# Patient Record
Sex: FEMALE | Race: WHITE | NOT HISPANIC OR LATINO | Employment: FULL TIME | URBAN - METROPOLITAN AREA
[De-identification: names, ages, dates, MRNs, and addresses within clinical notes are randomized per-mention and may not be internally consistent; named-entity substitution may affect disease eponyms.]

---

## 2021-04-08 DIAGNOSIS — Z23 ENCOUNTER FOR IMMUNIZATION: ICD-10-CM

## 2021-05-25 ENCOUNTER — OFFICE VISIT (OUTPATIENT)
Dept: FAMILY MEDICINE CLINIC | Facility: CLINIC | Age: 41
End: 2021-05-25
Payer: COMMERCIAL

## 2021-05-25 VITALS
RESPIRATION RATE: 19 BRPM | DIASTOLIC BLOOD PRESSURE: 88 MMHG | BODY MASS INDEX: 38.57 KG/M2 | SYSTOLIC BLOOD PRESSURE: 126 MMHG | HEART RATE: 91 BPM | HEIGHT: 66 IN | OXYGEN SATURATION: 98 % | TEMPERATURE: 97.6 F | WEIGHT: 240 LBS

## 2021-05-25 DIAGNOSIS — R53.83 TIREDNESS: ICD-10-CM

## 2021-05-25 DIAGNOSIS — E55.9 VITAMIN D DEFICIENCY: ICD-10-CM

## 2021-05-25 DIAGNOSIS — F41.9 ANXIETY: Primary | ICD-10-CM

## 2021-05-25 PROCEDURE — 3008F BODY MASS INDEX DOCD: CPT | Performed by: FAMILY MEDICINE

## 2021-05-25 PROCEDURE — 1036F TOBACCO NON-USER: CPT | Performed by: FAMILY MEDICINE

## 2021-05-25 PROCEDURE — 99203 OFFICE O/P NEW LOW 30 MIN: CPT | Performed by: FAMILY MEDICINE

## 2021-05-25 PROCEDURE — 3725F SCREEN DEPRESSION PERFORMED: CPT | Performed by: FAMILY MEDICINE

## 2021-05-25 RX ORDER — IBUPROFEN 200 MG
TABLET ORAL EVERY 6 HOURS PRN
COMMUNITY

## 2021-05-25 RX ORDER — LEVOCETIRIZINE DIHYDROCHLORIDE 5 MG/1
5 TABLET, FILM COATED ORAL DAILY
COMMUNITY

## 2021-05-25 NOTE — PROGRESS NOTES
Chief Complaint   Patient presents with   [address]     wants to talk about ADHD- unorganized, procrastination, easily distracted, trouble concentrating    Anxiety       Assessment/Plan:  Non fasting labs and Psychiatry evaluation  PHQ-9 Depression Screening    PHQ-9:   Frequency of the following problems over the past two weeks:      Little interest or pleasure in doing things: 0 - not at all  Feeling down, depressed, or hopeless: 1 - several days  PHQ-2 Score: 1       BMI Counseling: Body mass index is 38 74 kg/m²  The BMI is above normal  Nutrition recommendations include moderation in carbohydrate intake and increasing intake of lean protein  Diagnoses and all orders for this visit:    Anxiety  -     Ambulatory referral to Psychiatry; Future    Tiredness  -     CBC and differential; Future  -     Basic metabolic panel; Future  -     Hepatic function panel; Future  -     TSH, 3rd generation; Future  -     T4, free; Future  -     T3; Future  -     Ambulatory referral to Psychiatry; Future    Vitamin D deficiency  -     Vitamin D 25 hydroxy; Future    Other orders  -     levocetirizine (XYZAL) 5 MG tablet; Take 5 mg by mouth daily  -     ibuprofen (MOTRIN) 200 mg tablet; Take by mouth every 6 (six) hours as needed for mild pain          Subjective:      Patient ID: Pooja Salazar is a 38 y o  female  Re establish  Here for several issues, putting things off, easily distracted, anxiety  Thinks may have ADHD that may of been going on since a child  Got info off of [facility]  Classmate with similar symptoms  Just finished school, 2 years, para legal   Tired a lot or most of the time  Gets emotional when asking too many questions about her chief complaints  SH: neg tob or [place]  Cycles regular, has a GYN - not seen lately  Sleeps well  Single, no children  Worked while going to school  Presently applying for a job        The following portions of the patient's history were reviewed and updated as appropriate: allergies, current medications, past family history, past medical history, past social history, past surgical history and problem list     Review of Systems   Constitutional: Negative  HENT: Negative  Respiratory: Negative  Cardiovascular: Negative  Musculoskeletal: Negative  Skin: Negative  Psychiatric/Behavioral: Negative  Objective:      /88 (BP Location: Left arm)   Pulse 91   Temp 97 6 °F (36 4 °C) (Temporal)   Resp 19   Ht 5' 6" (1 676 m)   Wt 109 kg (240 lb)   SpO2 98%   BMI 38 74 kg/m²       Current Outpatient Medications:     ibuprofen (MOTRIN) 200 mg tablet, Take by mouth every 6 (six) hours as needed for mild pain, Disp: , Rfl:     levocetirizine (XYZAL) 5 MG tablet, Take 5 mg by mouth daily, Disp: , Rfl:      No Known Allergies     Physical Exam  Constitutional:       Appearance: She is well-developed  HENT:      Head: Normocephalic and atraumatic  Right Ear: External ear normal       Left Ear: External ear normal       Nose: Nose normal    Eyes:      Conjunctiva/sclera: Conjunctivae normal       Pupils: Pupils are equal, round, and reactive to light  Neck:      Musculoskeletal: Normal range of motion and neck supple  Cardiovascular:      Rate and Rhythm: Normal rate and regular rhythm  Heart sounds: Normal heart sounds  Pulmonary:      Effort: Pulmonary effort is normal       Breath sounds: Normal breath sounds  Abdominal:      General: Bowel sounds are normal       Palpations: Abdomen is soft  Skin:     General: Skin is warm and dry  Neurological:      Mental Status: She is alert and oriented to person, place, and time  Deep Tendon Reflexes: Reflexes are normal and symmetric  Psychiatric:         Behavior: Behavior normal          Thought Content:  Thought content normal          Judgment: Judgment normal

## 2021-05-26 ENCOUNTER — TELEPHONE (OUTPATIENT)
Dept: PSYCHIATRY | Facility: CLINIC | Age: 41
End: 2021-05-26

## 2021-05-27 ENCOUNTER — TELEPHONE (OUTPATIENT)
Dept: PSYCHIATRY | Facility: CLINIC | Age: 41
End: 2021-05-27

## 2021-05-27 NOTE — TELEPHONE ENCOUNTER
Behavorial Health Outpatient Intake Questions    Referred by: PCP    Please advised interviewee that they need to answer all questions truthfully to allow for best care and any misrepresentations of information may affect their ability to be seen at this clinic   => Was this discussed? Yes     [facility]     Presenting Problem (in patient's words): Possible ADHD and would like to be evaluated  Are there any developmental disabilities? ? If yes, can they speak to you on the phone? If they are too limited to speak to you on phone, refer out No    Are you taking any psychiatric medications? No    => If yes, who prescribes? If yes, are they injectable medications? Does the patient have a language barrier or hearing impairment? No    Have you been treated at [facility] by a therapist or a doctor in the past? If yes, who? No    Has the patient been hospitalized for mental health? No   If yes, how long ago was last hospitalization and where was it? Do you actively use alcohol or marijuana or illegal substances? If yes, what and how much - refer out to Drug and alcohol treatment if use is excessive or daily use of illegal substances No concerns of substance abuse are reported  Do you have a community treatment team or ? No    Legal History-     Does the patient have any history of arrests, prison/correction time, or DUIs? No  If Yes-  1) What types of charges? 2) When were they last incarcerated? 3) Are they currently on parole or probation? Minor Child-    Who has custody of the child? Is there a custody agreement? If there is a custody agreement remind parent that they must bring a copy to the first appt or they will not be seen       Intake Team, please check with provider before scheduling if flags come up such as:  - complex case  - legal history (other than DUI)  - communication barrier concerns are present  - if, in your judgment, this needs further review    ACCEPTED as a patient Yes  => Appointment Date: 09/10/2021 w/ Suzen Layer    Referred Elsewhere? No    Name of Insurance Co: [address] ID# [id number]  [id number] Phone #  If ins is primary or secondary  If patient is a minor, parents information such as Name, D  O B of guarantor

## 2021-09-10 ENCOUNTER — OFFICE VISIT (OUTPATIENT)
Dept: PSYCHIATRY | Facility: CLINIC | Age: 41
End: 2021-09-10
Payer: COMMERCIAL

## 2021-09-10 DIAGNOSIS — F41.9 ANXIETY: ICD-10-CM

## 2021-09-10 DIAGNOSIS — F43.21 ADJUSTMENT DISORDER WITH DEPRESSED MOOD: Primary | ICD-10-CM

## 2021-09-10 DIAGNOSIS — R53.83 TIREDNESS: ICD-10-CM

## 2021-09-10 DIAGNOSIS — R41.840 CONCENTRATION DEFICIT: ICD-10-CM

## 2021-09-10 PROCEDURE — 90792 PSYCH DIAG EVAL W/MED SRVCS: CPT | Performed by: NURSE PRACTITIONER

## 2021-09-10 RX ORDER — BUPROPION HYDROCHLORIDE 150 MG/1
150 TABLET ORAL DAILY
Qty: 30 TABLET | Refills: 2 | Status: SHIPPED | OUTPATIENT
Start: 2021-09-10 | End: 2021-10-08

## 2021-09-10 NOTE — BH TREATMENT PLAN
TREATMENT PLAN (Medication Management Only)        [facility]    Name and Date of Birth:  Talmage Castleman 42 y o  1980  Date of Treatment Plan: September 10, 2021  Diagnosis/Diagnoses:    1  Adjustment disorder with depressed mood    2  Anxiety    3  Tiredness    4  Concentration deficit      Strengths/Personal Resources for Self-Care: motivation for treatment, taking medications as prescribed, ability to communicate well, ability to listen, ability to reason  Area/Areas of need (in own words): depressive symptoms, attention and concentration problems  1  Long Term Goal: improve depression and improve attention and concentration  Target Date: 6 months - 3/10/2022  Person/Persons responsible for completion of goal: Talmage Castleman  2  Short Term Objective (s) - How will we reach this goal?:   A  Provider new recommended medication/dosage changes and/or continue medication(s): continue current medications as prescribed  B   Attend medication management appointments regularly  C   Attend psychotherapy regularly  Target Date: 6 months - 3/10/2022  Person/Persons Responsible for Completion of Goal: Talmage Castleman  Progress Towards Goals: starting treatment  Treatment Modality: medication management with psychotherapy every 4 weeks  Review due 90 to 120 days from date of this plan: 6 months - 3/10/2022  Expected length of service: maintenance unless revised  My Physician/PA/NP and I have developed this plan together and I agree to work on the goals and objectives  I understand the treatment goals that were developed for my treatment    Treatment Plan Verbal Consent - Due to COVID
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
## 2021-10-08 ENCOUNTER — OFFICE VISIT (OUTPATIENT)
Dept: PSYCHIATRY | Facility: CLINIC | Age: 41
End: 2021-10-08
Payer: COMMERCIAL

## 2021-10-08 DIAGNOSIS — R41.840 CONCENTRATION DEFICIT: ICD-10-CM

## 2021-10-08 DIAGNOSIS — F43.21 ADJUSTMENT DISORDER WITH DEPRESSED MOOD: ICD-10-CM

## 2021-10-08 PROCEDURE — 99214 OFFICE O/P EST MOD 30 MIN: CPT | Performed by: NURSE PRACTITIONER

## 2021-10-08 RX ORDER — BUPROPION HYDROCHLORIDE 300 MG/1
300 TABLET ORAL DAILY
Qty: 30 TABLET | Refills: 2 | Status: SHIPPED | OUTPATIENT
Start: 2021-10-08 | End: 2022-01-11 | Stop reason: SDUPTHER

## 2021-12-03 ENCOUNTER — OFFICE VISIT (OUTPATIENT)
Dept: PSYCHIATRY | Facility: CLINIC | Age: 41
End: 2021-12-03
Payer: COMMERCIAL

## 2021-12-03 DIAGNOSIS — F43.21 ADJUSTMENT DISORDER WITH DEPRESSED MOOD: Primary | ICD-10-CM

## 2021-12-03 PROCEDURE — 99213 OFFICE O/P EST LOW 20 MIN: CPT | Performed by: NURSE PRACTITIONER

## 2022-01-11 ENCOUNTER — TELEPHONE (OUTPATIENT)
Dept: PSYCHIATRY | Facility: CLINIC | Age: 42
End: 2022-01-11

## 2022-01-11 DIAGNOSIS — F43.21 ADJUSTMENT DISORDER WITH DEPRESSED MOOD: ICD-10-CM

## 2022-01-11 DIAGNOSIS — R41.840 CONCENTRATION DEFICIT: ICD-10-CM

## 2022-01-11 RX ORDER — BUPROPION HYDROCHLORIDE 300 MG/1
300 TABLET ORAL DAILY
Qty: 30 TABLET | Refills: 0 | Status: SHIPPED | OUTPATIENT
Start: 2022-01-11 | End: 2022-02-08 | Stop reason: SDUPTHER

## 2022-02-08 ENCOUNTER — OFFICE VISIT (OUTPATIENT)
Dept: PSYCHIATRY | Facility: CLINIC | Age: 42
End: 2022-02-08
Payer: COMMERCIAL

## 2022-02-08 DIAGNOSIS — F43.21 ADJUSTMENT DISORDER WITH DEPRESSED MOOD: ICD-10-CM

## 2022-02-08 DIAGNOSIS — F33.1 MODERATE EPISODE OF RECURRENT MAJOR DEPRESSIVE DISORDER (HCC): ICD-10-CM

## 2022-02-08 DIAGNOSIS — R41.840 CONCENTRATION DEFICIT: Primary | ICD-10-CM

## 2022-02-08 PROCEDURE — 99214 OFFICE O/P EST MOD 30 MIN: CPT | Performed by: NURSE PRACTITIONER

## 2022-02-08 RX ORDER — BUPROPION HYDROCHLORIDE 300 MG/1
300 TABLET ORAL DAILY
Qty: 90 TABLET | Refills: 1 | Status: SHIPPED | OUTPATIENT
Start: 2022-02-08 | End: 2022-05-10 | Stop reason: SDUPTHER

## 2022-02-08 NOTE — PSYCH
MEDICATION MANAGEMENT NOTE        [address]    Name and Date of Birth:  Robin Ferraro 42 y o  1980 MRN: [id number]    Date of Visit: February 8, 2022    No Known Allergies  SUBJECTIVE:    Constance is seen today for a follow up for Major Depressive Disorder, Generalized Anxiety Disorder and Concentration deficit  She reports that she has experienced on and off symptoms since the last visit  Patient reports that while anxiety has been relatively well controlled she continues to struggle with finding motivation for basic tasks such as laundry and struggles with concentration  Continues to report high level of stress  Recently had 3 relatives passed away including an uncle she had not seen since the pandemic  Uncle refused to be vaccinated and pt felt uncomfortable  Continues to care for father who is undergoing chemotherapy  Worries about political situation, covid and other world affairs  She denies any side effects from medications  PLAN:  Initiate Zoloft 25 mg p o  Daily for 3 days then increase to 50 mg p o  Daily thereafter  Continue Wellbutrin  mg p o   Daily  Patient had individual therapy wait list; recommend call insurance company for list of in network providers  Follow-up in 3 months  Aware of 24 hour and weekend coverage for urgent situations accessed by calling [facility] main practice number    Diagnoses and all orders for this visit:    Concentration deficit    Adjustment disorder with depressed mood        Current Outpatient Medications on File Prior to Visit   Medication Sig Dispense Refill    buPROPion (WELLBUTRIN XL) 300 mg 24 hr tablet Take 1 tablet (300 mg total) by mouth daily 30 tablet 0    ibuprofen (MOTRIN) 200 mg tablet Take by mouth every 6 (six) hours as needed for mild pain      levocetirizine (XYZAL) 5 MG tablet Take 5 mg by mouth daily       No current facility-administered medications on file prior to visit  Psychotherapy Provided:     Individual psychotherapy provided: Yes  Counseling was provided during the session today for 16 minutes  Supportive counseling provided  HPI ROS Appetite Changes and Sleep:     She reports normal sleep, normal appetite, normal energy level   Denies homicidal ideation, denies suicidal ideation    Review Of Systems:      General emotional problems and decreased functioning   Personality no change in personality   Constitutional negative   ENT negative   Cardiovascular negative   Respiratory negative   Gastrointestinal negative   Genitourinary negative   Musculoskeletal negative   Integumentary negative   Neurological negative   Endocrine negative   Other Symptoms none, all other systems are negative     Mental Status Evaluation:    Appearance Adequate hygiene and grooming   Behavior cooperative and guarded   Mood depressed and angry  Depression Scale -  of 10 (0 = No depression)  Anxiety Scale -  of 10 (0 = No anxiety)   Speech Normal rate and volume   Affect constricted and Tearful   Thought Processes Goal directed and coherent   Thought Content Does not verbalize delusional material   Associations Tightly connected   Perceptual Disturbances Denies hallucinations and does not appear to be responding to internal stimuli   Risk Potential Suicidal/Homicidal Ideation - No evidence of suicidal or homicidal ideation and patient does not verbalize suicidal or homicidal ideation  Risk of Violence - No evidence of risk for violence found on assessment  Risk of Self Mutilation - No evidence of risk for self mutilation found on assessment   Orientation oriented to person, place, time/date and situation   Memory recent and remote memory grossly intact   Consciousness alert and awake   Attention/Concentration attention span and concentration are age appropriate   Insight partial   Judgement Fair   Muscle Strength and Gait normal muscle strength and normal muscle tone, normal gait/station and normal balance   Motor Activity no abnormal movements   Language no difficulty naming common objects, no difficulty repeating a phrase, no difficulty writing a sentence   Fund of Knowledge adequate knowledge of current events  adequate fund of knowledge regarding past history  adequate fund of knowledge regarding vocabulary      Past Psychiatric History Update:     Inpatient Psychiatric Admission Since Last Encounter:   no  Changes to Outpatient Psychiatric Treatment Team:    no  Suicide Attempt Or Self Mutilation Since Last Encounter:   no  Incidence of Violent Behavior Since Last Encounter:   no    Traumatic History Update:     New Onset of Abuse Since Last Encounter:   no  Traumatic Events Since Last Encounter:   no    Past Medical History:    No past medical history on file  No past medical history pertinent negatives    Past Surgical History:   Procedure Laterality Date    WISDOM TOOTH EXTRACTION       No Known Allergies  Substance Abuse History:    Social History     Substance and Sexual Activity   Alcohol Use Not Currently     Social History     Substance and Sexual Activity   Drug Use Not on file     Social History:    Social History     Socioeconomic History    Marital status: Single     Spouse name: Not on file    Number of children: Not on file    Years of education: Not on file    Highest education level: Not on file   Occupational History    Not on file   Tobacco Use    Smoking status: Former Smoker     Packs/day: 1 00     Years: 20 00     Pack years: 20 00     Types: Cigarettes     Start date: 55     Quit date: 2016     Years since quittin 7    Smokeless tobacco: Never Used   Vaping Use    Vaping Use: Never used   Substance and Sexual Activity    Alcohol use: Not Currently    Drug use: Not on file    Sexual activity: Not on file   Other Topics Concern    Not on file   Social History Narrative    Not on file     Social Determinants of Health Financial Resource Strain: Not on file   Food Insecurity: Not on file   Transportation Needs: Not on file   Physical Activity: Not on file   Stress: Not on file   Social Connections: Not on file   Intimate Partner Violence: Not on file   Housing Stability: Not on file     Family Psychiatric History:     Family History   Problem Relation Age of Onset    Hypertension Mother     Heart disease Father     Diabetes Father     Kidney disease Paternal Grandmother     Heart attack Paternal Grandfather      History Review: The following portions of the patient's history were reviewed and updated as appropriate: allergies, current medications, past family history, past medical history, past social history, past surgical history and problem list     OBJECTIVE:     Vital signs in last 24 hours: There were no vitals filed for this visit  Laboratory Results: I have personally reviewed all pertinent laboratory/tests results  Suicide/Homicide Risk Assessment:    Risk of Harm to Self:   The following ratings are based on assessment at the time of the interview   Recent Specific Risk Factors include: current depressive symptoms, current anxiety symptoms    Risk of Harm to Others:   The following ratings are based on assessment at the time of the interview   Recent Specific Risk Factors include: none  The following interventions are recommended: no intervention changes needed    Medications Risks/Benefits:      Risks, Benefits And Possible Side Effects Of Medications:    Discussed risks and benefits of treatment with patient including risk of suicidality, serotonin syndrome, increased QTc interval and SIADH related to treatment with antidepressants;  Risk of induction of manic symptoms in certain patient populations and Reduction in seizure threshold related to treatment with bupropion      Controlled Medication Discussion:     Not applicable    Treatment Plan:    Due for update/Updated:   yes    Evelin Eldridge SIMRAN 02/08/22    This note was shared with patient

## 2022-02-08 NOTE — BH TREATMENT PLAN
TREATMENT PLAN (Medication Management Only)        [facility]    Name and Date of Birth:  Saleem Solares 42 y o  1980  Date of Treatment Plan: February 8, 2022  Diagnosis/Diagnoses:    1  Concentration deficit    2  Adjustment disorder with depressed mood    3  Moderate episode of recurrent major depressive disorder [facility])      Strengths/Personal Resources for Self-Care: motivation for treatment, taking medications as prescribed, ability to communicate well, ability to listen, ability to reason  Area/Areas of need (in own words): depressive symptoms, anxiety symptoms  1  Long Term Goal: improve anxiety and improve depression  Target Date: 6 months - 8/8/2022  Person/Persons responsible for completion of goal: Saleem Beck  2  Short Term Objective (s) - How will we reach this goal?:   A  Provider new recommended medication/dosage changes and/or continue medication(s): continue current medications as prescribed  B   Attend medication management appointments regularly  C   Attend psychotherapy regularly  Target Date: 6 months - 8/8/2022  Person/Persons Responsible for Completion of Goal: Saleem Solares  Progress Towards Goals: continuing treatment  Treatment Modality: medication management with psychotherapy every 3 months  Review due 90 to 120 days from date of this plan: 6 months - 8/8/2022  Expected length of service: maintenance unless revised     My Physician/PA/NP and I have developed this plan together and I agree to work on the goals and objectives  I understand the treatment goals that were developed for my treatment    Treatment Plan Verbal Consent - Due to COVID

## 2022-05-10 ENCOUNTER — OFFICE VISIT (OUTPATIENT)
Dept: PSYCHIATRY | Facility: CLINIC | Age: 42
End: 2022-05-10
Payer: COMMERCIAL

## 2022-05-10 DIAGNOSIS — F43.21 ADJUSTMENT DISORDER WITH DEPRESSED MOOD: ICD-10-CM

## 2022-05-10 DIAGNOSIS — R41.840 CONCENTRATION DEFICIT: ICD-10-CM

## 2022-05-10 DIAGNOSIS — F33.1 MODERATE EPISODE OF RECURRENT MAJOR DEPRESSIVE DISORDER (HCC): Primary | ICD-10-CM

## 2022-05-10 PROCEDURE — 99214 OFFICE O/P EST MOD 30 MIN: CPT | Performed by: NURSE PRACTITIONER

## 2022-05-10 RX ORDER — BUPROPION HYDROCHLORIDE 300 MG/1
300 TABLET ORAL DAILY
Qty: 90 TABLET | Refills: 1 | Status: SHIPPED | OUTPATIENT
Start: 2022-05-10

## 2022-05-10 NOTE — PSYCH
MEDICATION MANAGEMENT NOTE        [facility]    Name and Date of Birth:  Christal Landis 42 y o  1980 MRN: [id number]    Date of Visit: May 10, 2022    No Known Allergies  SUBJECTIVE:    Aidan Smith is seen today for a follow up for Major Depressive Disorder and Generalized Anxiety Disorder  She reports that she has done fairly well since the last visit  Reports that since starting sertraline feelings of depression anxiety have resolved  Feels more energetic and rested in the morning  Has noticed some more frequent awakenings at night  Recommend trial melatonin 10 mg at bedtime for 2 weeks  She denies any side effects from medications  PLAN:  Initiate melatonin 10 mg PO HS for 2 weeks  Continue sertraline 50 mg p o  Daily  Continue Wellbutrin  mg p o  Daily  Follow-up in 4 months  Aware of 24 hour and weekend coverage for urgent situations accessed by calling [facility] main practice number    Diagnoses and all orders for this visit:    Moderate episode of recurrent major depressive disorder (HCC)    Concentration deficit  -     buPROPion (WELLBUTRIN XL) 300 mg 24 hr tablet; Take 1 tablet (300 mg total) by mouth daily  -     sertraline (ZOLOFT) 50 mg tablet; Take 1 tablet (50 mg total) by mouth daily    Adjustment disorder with depressed mood  -     buPROPion (WELLBUTRIN XL) 300 mg 24 hr tablet; Take 1 tablet (300 mg total) by mouth daily  -     sertraline (ZOLOFT) 50 mg tablet;  Take 1 tablet (50 mg total) by mouth daily        Current Outpatient Medications on File Prior to Visit   Medication Sig Dispense Refill    ibuprofen (MOTRIN) 200 mg tablet Take by mouth every 6 (six) hours as needed for mild pain      levocetirizine (XYZAL) 5 MG tablet Take 5 mg by mouth daily      [DISCONTINUED] buPROPion (WELLBUTRIN XL) 300 mg 24 hr tablet Take 1 tablet (300 mg total) by mouth daily 90 tablet 1    [DISCONTINUED] sertraline (ZOLOFT) 50 mg tablet Take 1 tablet (50 mg total) by mouth daily 90 tablet 1     No current facility-administered medications on file prior to visit  Psychotherapy Provided:     Individual psychotherapy provided: Yes  Counseling was provided during the session today for 16 minutes  Supportive counseling provided  HPI ROS Appetite Changes and Sleep:     She reports normal sleep, normal appetite, normal energy level   Denies homicidal ideation, denies suicidal ideation    Review Of Systems:      General normal    Personality no change in personality   Constitutional negative   ENT negative   Cardiovascular negative   Respiratory negative   Gastrointestinal negative   Genitourinary negative   Musculoskeletal negative   Integumentary negative   Neurological negative   Endocrine negative   Other Symptoms none, all other systems are negative     Mental Status Evaluation:    Appearance Adequate hygiene and grooming   Behavior calm and cooperative   Mood euthymic  Depression Scale -  of 10 (0 = No depression)  Anxiety Scale -  of 10 (0 = No anxiety)   Speech Normal rate and volume   Affect appropriate and mood-congruent   Thought Processes Goal directed and coherent   Thought Content Does not verbalize delusional material   Associations Tightly connected   Perceptual Disturbances Denies hallucinations and does not appear to be responding to internal stimuli   Risk Potential Suicidal/Homicidal Ideation - No evidence of suicidal or homicidal ideation and patient does not verbalize suicidal or homicidal ideation  Risk of Violence - No evidence of risk for violence found on assessment  Risk of Self Mutilation - No evidence of risk for self mutilation found on assessment   Orientation oriented to person, place, time/date and situation   Memory recent and remote memory grossly intact   Consciousness alert and awake   Attention/Concentration attention span and concentration are age appropriate   Insight intact Judgement intact   Muscle Strength and Gait normal muscle strength and normal muscle tone, normal gait/station and normal balance   Motor Activity no abnormal movements   Language no difficulty naming common objects, no difficulty repeating a phrase, no difficulty writing a sentence   Fund of Knowledge adequate knowledge of current events  adequate fund of knowledge regarding past history  adequate fund of knowledge regarding vocabulary      Past Psychiatric History Update:     Inpatient Psychiatric Admission Since Last Encounter:   no  Changes to Outpatient Psychiatric Treatment Team:    no  Suicide Attempt Or Self Mutilation Since Last Encounter:   no  Incidence of Violent Behavior Since Last Encounter:   no    Traumatic History Update:     New Onset of Abuse Since Last Encounter:   no  Traumatic Events Since Last Encounter:   no    Past Medical History:    No past medical history on file  No past medical history pertinent negatives    Past Surgical History:   Procedure Laterality Date    WISDOM TOOTH EXTRACTION       No Known Allergies  Substance Abuse History:    Social History     Substance and Sexual Activity   Alcohol Use Not Currently     Social History     Substance and Sexual Activity   Drug Use Not on file     Social History:    Social History     Socioeconomic History    Marital status: Single     Spouse name: Not on file    Number of children: Not on file    Years of education: Not on file    Highest education level: Not on file   Occupational History    Not on file   Tobacco Use    Smoking status: Former Smoker     Packs/day: 1 00     Years: 20 00     Pack years: 20 00     Types: Cigarettes     Start date: 55     Quit date: 2016     Years since quittin 9    Smokeless tobacco: Never Used   Vaping Use    Vaping Use: Never used   Substance and Sexual Activity    Alcohol use: Not Currently    Drug use: Not on file    Sexual activity: Not on file   Other Topics Concern    Not on file   Social History Narrative    Not on file     Social Determinants of Health     Financial Resource Strain: Not on file   Food Insecurity: Not on file   Transportation Needs: Not on file   Physical Activity: Not on file   Stress: Not on file   Social Connections: Not on file   Intimate Partner Violence: Not on file   Housing Stability: Not on file     Family Psychiatric History:     Family History   Problem Relation Age of Onset    Hypertension Mother     Heart disease Father     Diabetes Father     Kidney disease Paternal Grandmother     Heart attack Paternal Grandfather      History Review: The following portions of the patient's history were reviewed and updated as appropriate: allergies, current medications, past family history, past medical history, past social history, past surgical history and problem list     OBJECTIVE:     Vital signs in last 24 hours: There were no vitals filed for this visit  Laboratory Results: I have personally reviewed all pertinent laboratory/tests results  Suicide/Homicide Risk Assessment:    Risk of Harm to Self:   The following ratings are based on assessment at the time of the interview   Recent Specific Risk Factors include: none    Risk of Harm to Others:   The following ratings are based on assessment at the time of the interview   Recent Specific Risk Factors include: none  The following interventions are recommended: no intervention changes needed    Medications Risks/Benefits:      Risks, Benefits And Possible Side Effects Of Medications:    Discussed risks and benefits of treatment with patient including risk of suicidality, serotonin syndrome, increased QTc interval and SIADH related to treatment with antidepressants;  Risk of induction of manic symptoms in certain patient populations and Reduction in seizure threshold related to treatment with bupropion      Controlled Medication Discussion:     Marisela Tristan has been filling controlled prescriptions on time as prescribed according to [facility]    Treatment Plan:    Due for update/Updated:   SIMRAN Mckeon 05/10/22    This note was shared with patient

## 2022-09-09 ENCOUNTER — OFFICE VISIT (OUTPATIENT)
Dept: PSYCHIATRY | Facility: CLINIC | Age: 42
End: 2022-09-09
Payer: COMMERCIAL

## 2022-09-09 DIAGNOSIS — R41.840 CONCENTRATION DEFICIT: Primary | ICD-10-CM

## 2022-09-09 PROCEDURE — 99214 OFFICE O/P EST MOD 30 MIN: CPT | Performed by: NURSE PRACTITIONER

## 2022-09-09 RX ORDER — BUPROPION HYDROCHLORIDE 150 MG/1
150 TABLET ORAL DAILY
Qty: 5 TABLET | Refills: 0 | Status: SHIPPED | OUTPATIENT
Start: 2022-09-09

## 2022-09-09 RX ORDER — ATOMOXETINE 40 MG/1
40 CAPSULE ORAL DAILY
Qty: 30 CAPSULE | Refills: 0 | Status: SHIPPED | OUTPATIENT
Start: 2022-09-14 | End: 2022-10-18 | Stop reason: SDUPTHER

## 2022-09-09 NOTE — PSYCH
This note was not shared with the patient due to reasonable likelihood of causing patient harm    MEDICATION MANAGEMENT NOTE        [address] [place] [address]    Name and Date of Birth:  Chace Corado 36 y o  1980 MRN: [id number]    Date of Visit: September 9, 2022    No Known Allergies  SUBJECTIVE:    Kailee Adair is seen today for a follow up for depression, anxiety, insomnia and Concentration deficit  She reports that she has done fairly well since the last visit  Patient reports she has remained relatively stable  Sleep has improved somewhat over the last few days after starting use of weighted blanket  Reports that external stressors have decreased, father-in-law's doing better  Feels depression is relatively well controlled  Remains anxious in some situations is working towards improving overall anxiety  Believes sertraline has been helpful for improving depression anxiety  Patient is still concerned about difficulty with motivation, concentration and work performance  Was never able to start work as a  due to difficulty with concentration and motivation  Patient is concerned she may have ADHD  Is open to trial of Strattera  Will discontinue Abilify bupropion and initiate Strattera  Patient educated on risk of return some depressive symptoms following discontinuation of Wellbutrin  Patient is aware of risks and agrees with plan of care  She denies any side effects from medications  PLAN:  Reduce Wellbutrin  mg p o  Daily for 5 days then discontinue; Then initiate Strattera 40 mg p o  Daily  Continue sertraline 50 mg p o   Daily; consider increase if needed  Recommend neuropsychological testing  Follow-up in 3 months; may schedule an earlier appointment to review neuropsychological testing  Aware of 24 hour and weekend coverage for urgent situations accessed by calling [facility] main practice number    Diagnoses and all orders for this visit:    Concentration deficit  -     atoMOXetine (STRATTERA) 40 mg capsule; Take 1 capsule (40 mg total) by mouth daily  -     buPROPion (Wellbutrin XL) 150 mg 24 hr tablet; Take 1 tablet (150 mg total) by mouth daily Then stop this medication        Current Outpatient Medications on File Prior to Visit   Medication Sig Dispense Refill    buPROPion (WELLBUTRIN XL) 300 mg 24 hr tablet Take 1 tablet (300 mg total) by mouth daily 90 tablet 1    ibuprofen (MOTRIN) 200 mg tablet Take by mouth every 6 (six) hours as needed for mild pain      levocetirizine (XYZAL) 5 MG tablet Take 5 mg by mouth daily      sertraline (ZOLOFT) 50 mg tablet Take 1 tablet (50 mg total) by mouth daily 90 tablet 1     No current facility-administered medications on file prior to visit  Psychotherapy Provided:     Individual psychotherapy provided: Yes  Counseling was provided during the session today for 16 minutes  Supportive counseling provided  HPI ROS Appetite Changes and Sleep:     She reports Improved sleep 6-7 hrs, adequate appetite, adequate energy level, low motivation   Denies homicidal ideation, denies suicidal ideation    Review Of Systems:      General emotional problems and decreased functioning   Personality no change in personality   Constitutional negative   ENT negative   Cardiovascular negative   Respiratory negative   Gastrointestinal negative   Genitourinary negative   Musculoskeletal negative   Integumentary negative   Neurological negative   Endocrine negative   Other Symptoms none, all other systems are negative     Mental Status Evaluation:    Appearance Adequate hygiene and grooming   Behavior calm and cooperative   Mood anxious  Depression Scale -  of 10 (0 = No depression)  Anxiety Scale -  of 10 (0 = No anxiety)   Speech Normal rate and volume   Affect mood-congruent   Thought Processes Goal directed and coherent   Thought Content Does not verbalize delusional material   Associations Tightly connected   Perceptual Disturbances Denies hallucinations and does not appear to be responding to internal stimuli   Risk Potential Suicidal/Homicidal Ideation - No evidence of suicidal or homicidal ideation and patient does not verbalize suicidal or homicidal ideation  Risk of Violence - No evidence of risk for violence found on assessment  Risk of Self Mutilation - No evidence of risk for self mutilation found on assessment   Orientation oriented to person, place, time/date and situation   Memory recent and remote memory grossly intact   Consciousness alert and awake   Attention/Concentration attention span and concentration are age appropriate   Insight fair   Judgement fair   Muscle Strength and Gait normal muscle strength and normal muscle tone, normal gait/station and normal balance   Motor Activity no abnormal movements   Language no difficulty naming common objects, no difficulty repeating a phrase, no difficulty writing a sentence   Fund of Knowledge adequate knowledge of current events  adequate fund of knowledge regarding past history  adequate fund of knowledge regarding vocabulary      Past Psychiatric History Update:     Inpatient Psychiatric Admission Since Last Encounter:   no  Changes to Outpatient Psychiatric Treatment Team:    no  Suicide Attempt Or Self Mutilation Since Last Encounter:   no  Incidence of Violent Behavior Since Last Encounter:   no    Traumatic History Update:     New Onset of Abuse Since Last Encounter:   no  Traumatic Events Since Last Encounter:   no    Past Medical History:    No past medical history on file       Past Surgical History:   Procedure Laterality Date    WISDOM TOOTH EXTRACTION  1996     No Known Allergies  Substance Abuse History:    Social History     Substance and Sexual Activity   Alcohol Use Not Currently     Social History     Substance and Sexual Activity   Drug Use Not on file     Social History:    Social History Socioeconomic History    Marital status: Single     Spouse name: Not on file    Number of children: Not on file    Years of education: Not on file    Highest education level: Not on file   Occupational History    Not on file   Tobacco Use    Smoking status: Former Smoker     Packs/day: 1 00     Years: 20 00     Pack years: 20 00     Types: Cigarettes     Start date: 55     Quit date: 2016     Years since quittin 2    Smokeless tobacco: Never Used   Vaping Use    Vaping Use: Never used   Substance and Sexual Activity    Alcohol use: Not Currently    Drug use: Not on file    Sexual activity: Not on file   Other Topics Concern    Not on file   Social History Narrative    Not on file     Social Determinants of Health     Financial Resource Strain: Not on file   Food Insecurity: Not on file   Transportation Needs: Not on file   Physical Activity: Not on file   Stress: Not on file   Social Connections: Not on file   Intimate Partner Violence: Not on file   Housing Stability: Not on file     Family Psychiatric History:     Family History   Problem Relation Age of Onset    Hypertension Mother     Heart disease Father     Diabetes Father     Kidney disease Paternal Grandmother     Heart attack Paternal Grandfather      History Review: The following portions of the patient's history were reviewed and updated as appropriate: allergies, current medications, past family history, past medical history, past social history, past surgical history and problem list     OBJECTIVE:     Vital signs in last 24 hours: There were no vitals filed for this visit  Laboratory Results: I have personally reviewed all pertinent laboratory/tests results      Suicide/Homicide Risk Assessment:    Risk of Harm to Self:   The following ratings are based on assessment at the time of the interview   Recent Specific Risk Factors include: current anxiety symptoms    Risk of Harm to Others:   The following ratings are based on assessment at the time of the interview   Recent Specific Risk Factors include: none  The following interventions are recommended: no intervention changes needed    Medications Risks/Benefits:      Risks, Benefits And Possible Side Effects Of Medications:    Discussed risks and benefits of treatment with patient including risk of suicidality, serotonin syndrome, increased QTc interval and SIADH related to treatment with antidepressants;  Risk of induction of manic symptoms in certain patient populations and Reduction in seizure threshold related to treatment with bupropion      Controlled Medication Discussion:     Not applicable    Treatment Plan:    Due for update/Updated:   SIMRAN He 09/09/22

## 2022-10-18 DIAGNOSIS — R41.840 CONCENTRATION DEFICIT: ICD-10-CM

## 2022-10-18 RX ORDER — ATOMOXETINE 40 MG/1
40 CAPSULE ORAL DAILY
Qty: 30 CAPSULE | Refills: 3 | Status: SHIPPED | OUTPATIENT
Start: 2022-10-18

## 2022-10-18 NOTE — TELEPHONE ENCOUNTER
Patient left voicemail requesting refill on selected medication stating that it was working for them   Next appointment with provider 12/13/22

## 2022-11-10 DIAGNOSIS — R41.840 CONCENTRATION DEFICIT: ICD-10-CM

## 2022-11-10 DIAGNOSIS — F43.21 ADJUSTMENT DISORDER WITH DEPRESSED MOOD: ICD-10-CM

## 2022-11-10 RX ORDER — BUPROPION HYDROCHLORIDE 300 MG/1
TABLET ORAL
Qty: 90 TABLET | Refills: 1 | Status: SHIPPED | OUTPATIENT
Start: 2022-11-10

## 2022-11-22 DIAGNOSIS — R41.840 CONCENTRATION DEFICIT: ICD-10-CM

## 2022-11-22 DIAGNOSIS — F43.21 ADJUSTMENT DISORDER WITH DEPRESSED MOOD: ICD-10-CM

## 2022-11-22 NOTE — TELEPHONE ENCOUNTER
Received voicemail from pt at 518 pm requesting refill of Sertraline    Next scheduled appointment 12/13/2022

## 2022-12-13 ENCOUNTER — OFFICE VISIT (OUTPATIENT)
Dept: PSYCHIATRY | Facility: CLINIC | Age: 42
End: 2022-12-13

## 2022-12-13 DIAGNOSIS — R41.840 CONCENTRATION DEFICIT: ICD-10-CM

## 2022-12-13 DIAGNOSIS — F43.21 ADJUSTMENT DISORDER WITH DEPRESSED MOOD: ICD-10-CM

## 2022-12-13 RX ORDER — ATOMOXETINE 40 MG/1
40 CAPSULE ORAL DAILY
Qty: 90 CAPSULE | Refills: 1 | Status: SHIPPED | OUTPATIENT
Start: 2022-12-13

## 2022-12-13 NOTE — PSYCH
Visit Time    Visit Start Time: 9779  Visit Stop Time: 3289  Total Visit Duration: 17 minutes   This note was not shared with the patient due to reasonable likelihood of causing patient harm    MEDICATION MANAGEMENT NOTE        Luis Ahn    Name and Date of Birth:  Ralf Proctor 36 y o  1980 MRN: [id number]    Date of Visit: December 13, 2022    No Known Allergies  SUBJECTIVE:    Denise Shirley is seen today for a follow up for Major Depressive Disorder and poor concentration   She reports that she has improved since the last visit  Patient reports that she has found addition of Strattera helpful for concentration  States that work is busy but she is overall functioning well  Still struggles with motivation at times  Tasks like putting up the [employer] lights etc  never seem to get done  However, patient states she is no longer feeling hopeless and trapped the way she was when she first began treatment  Has not yet completed neuropsychiatric testing to series of family emergencies with father  She denies any side effects from medications  PLAN:  Continue Strattera 40 mg p o  daily; consider increase if needed  Continue sertraline 50 mg p o  daily  Follow up in 3 to 4 months  Consider neuropsychiatric testing for ADHD  Aware of 24 hour and weekend coverage for urgent situations accessed by calling [facility] main practice number    Diagnoses and all orders for this visit:    Concentration deficit  -     atoMOXetine (STRATTERA) 40 mg capsule; Take 1 capsule (40 mg total) by mouth daily  -     sertraline (ZOLOFT) 50 mg tablet; Take 1 tablet (50 mg total) by mouth daily    Adjustment disorder with depressed mood  -     sertraline (ZOLOFT) 50 mg tablet;  Take 1 tablet (50 mg total) by mouth daily      Current Outpatient Medications on File Prior to Visit   Medication Sig Dispense Refill   • ibuprofen (MOTRIN) 200 mg tablet Take by mouth every 6 (six) hours as needed for mild pain     • levocetirizine (XYZAL) 5 MG tablet Take 5 mg by mouth daily     • [DISCONTINUED] atoMOXetine (STRATTERA) 40 mg capsule Take 1 capsule (40 mg total) by mouth daily 30 capsule 3   • [DISCONTINUED] buPROPion (Wellbutrin XL) 150 mg 24 hr tablet Take 1 tablet (150 mg total) by mouth daily Then stop this medication 5 tablet 0   • [DISCONTINUED] buPROPion (WELLBUTRIN XL) 300 mg 24 hr tablet TAKE 1 TABLET BY MOUTH EVERY DAY 90 tablet 1   • [DISCONTINUED] sertraline (ZOLOFT) 50 mg tablet Take 1 tablet (50 mg total) by mouth daily 90 tablet 1     No current facility-administered medications on file prior to visit  Psychotherapy Provided:     Individual psychotherapy provided: Yes  Counseling was provided during the session today for 16 minutes  Supportive counseling provided  HPI ROS Appetite Changes and Sleep:     She reports normal sleep, adequate appetite, normal energy level   Denies homicidal ideation, denies suicidal ideation    Review Of Systems:      General normal    Personality no change in personality   Constitutional negative   ENT negative   Cardiovascular negative   Respiratory negative   Gastrointestinal negative   Genitourinary negative   Musculoskeletal negative   Integumentary negative   Neurological negative   Endocrine negative   Other Symptoms none, all other systems are negative     Mental Status Evaluation:    Appearance Adequate hygiene and grooming   Behavior calm and cooperative   Mood euthymic  Depression Scale -  of 10 (0 = No depression)  Anxiety Scale -  of 10 (0 = No anxiety)   Speech Normal rate and volume   Affect appropriate and mood-congruent   Thought Processes Goal directed and coherent   Thought Content Does not verbalize delusional material   Associations Tightly connected   Perceptual Disturbances Denies hallucinations and does not appear to be responding to internal stimuli   Risk Potential Suicidal/Homicidal Ideation - No evidence of suicidal or homicidal ideation and patient does not verbalize suicidal or homicidal ideation  Risk of Violence - No evidence of risk for violence found on assessment  Risk of Self Mutilation - No evidence of risk for self mutilation found on assessment   Orientation oriented to person, place, time/date and situation   Memory recent and remote memory grossly intact   Consciousness alert and awake   Attention/Concentration attention span and concentration are age appropriate   Insight intact   Judgement intact   Muscle Strength and Gait normal muscle strength and normal muscle tone, normal gait/station and normal balance   Motor Activity no abnormal movements   Language no difficulty naming common objects, no difficulty repeating a phrase, no difficulty writing a sentence   Fund of Knowledge adequate knowledge of current events  adequate fund of knowledge regarding past history  adequate fund of knowledge regarding vocabulary      Past Psychiatric History Update:     Inpatient Psychiatric Admission Since Last Encounter:   no  Changes to Outpatient Psychiatric Treatment Team:    no  Suicide Attempt Or Self Mutilation Since Last Encounter:   no  Incidence of Violent Behavior Since Last Encounter:   no    Traumatic History Update:     New Onset of Abuse Since Last Encounter:   no  Traumatic Events Since Last Encounter:   no    Past Medical History:    No past medical history on file       Past Surgical History:   Procedure Laterality Date   • WISDOM TOOTH EXTRACTION  1996     No Known Allergies  Substance Abuse History:    Social History     Substance and Sexual Activity   Alcohol Use Not Currently     Social History     Substance and Sexual Activity   Drug Use Not on file     Social History:    Social History     Socioeconomic History   • Marital status: Single     Spouse name: Not on file   • Number of children: Not on file   • Years of education: Not on file   • Highest education level: Not on file Occupational History   • Not on file   Tobacco Use   • Smoking status: Former     Packs/day: 1 00     Years: 20 00     Pack years: 20 00     Types: Cigarettes     Start date: 55     Quit date: 2016     Years since quittin 5   • Smokeless tobacco: Never   Vaping Use   • Vaping Use: Never used   Substance and Sexual Activity   • Alcohol use: Not Currently   • Drug use: Not on file   • Sexual activity: Not on file   Other Topics Concern   • Not on file   Social History Narrative   • Not on file     Social Determinants of Health     Financial Resource Strain: Not on file   Food Insecurity: Not on file   Transportation Needs: Not on file   Physical Activity: Not on file   Stress: Not on file   Social Connections: Not on file   Intimate Partner Violence: Not on file   Housing Stability: Not on file     Family Psychiatric History:     Family History   Problem Relation Age of Onset   • Hypertension Mother    • Heart disease Father    • Diabetes Father    • Kidney disease Paternal Grandmother    • Heart attack Paternal Grandfather      History Review: The following portions of the patient's history were reviewed and updated as appropriate: allergies, current medications, past family history, past medical history, past social history, past surgical history and problem list     OBJECTIVE:     Vital signs in last 24 hours: There were no vitals filed for this visit  Laboratory Results: I have personally reviewed all pertinent laboratory/tests results  Suicide/Homicide Risk Assessment:    Risk of Harm to Self:  Recent Specific Risk Factors include: none    Risk of Harm to Others: The following ratings are based on assessment at the time of the interview  Recent Specific Risk Factors include: none      The following interventions are recommended: no intervention changes needed    Medications Risks/Benefits:      Risks, Benefits And Possible Side Effects Of Medications:    Discussed risks and benefits of treatment with patient including risk of suicidality, serotonin syndrome, increased QTc interval and SIADH related to treatment with antidepressants;  Risk of induction of manic symptoms in certain patient populations     Controlled Medication Discussion:     Not applicable    Treatment Plan:    Due for update/Updated:   SIMRAN Schneider 12/13/22

## 2023-05-26 ENCOUNTER — OFFICE VISIT (OUTPATIENT)
Dept: PSYCHIATRY | Facility: CLINIC | Age: 43
End: 2023-05-26

## 2023-05-26 DIAGNOSIS — F41.1 GENERALIZED ANXIETY DISORDER: Primary | ICD-10-CM

## 2023-05-26 DIAGNOSIS — R41.840 CONCENTRATION DEFICIT: ICD-10-CM

## 2023-05-26 DIAGNOSIS — F43.21 ADJUSTMENT DISORDER WITH DEPRESSED MOOD: ICD-10-CM

## 2023-05-26 RX ORDER — ATOMOXETINE 80 MG/1
80 CAPSULE ORAL DAILY
Qty: 90 CAPSULE | Refills: 0 | Status: SHIPPED | OUTPATIENT
Start: 2023-05-26 | End: 2023-08-24

## 2023-05-26 NOTE — BH TREATMENT PLAN
"TREATMENT PLAN (Medication Management Only)        Luis Ahn    Name and Date of Birth:  Christal Favre 36 y o  1980  Date of Treatment Plan: May 26, 2023  Diagnosis/Diagnoses:    1  Generalized anxiety disorder    2  Concentration deficit    3  Adjustment disorder with depressed mood      Strengths/Personal Resources for Self-Care: \"my mom\", supportive family  Area/Areas of need (in own words): anxiety, ADHD symptoms  1  Long Term Goal: continue improvement in ADHD symptoms  Target Date:6 months - 11/26/2023  Person/Persons responsible for completion of goal: Coby  2  Short Term Objective (s) - How will we reach this goal?:   A  Provider new recommended medication/dosage changes and/or continue medication(s): continue current medications as prescribed  B  Attend medication management appointments regularly  C  N/A  Target Date:6 months - 11/26/2023  Person/Persons Responsible for Completion of Goal: Coby  Progress Towards Goals: continuing treatment  Treatment Modality: medication management every 3 months  Review due 180 days from date of this plan: 6 months - 11/26/2023  Expected length of service: maintenance  My Physician/PA/NP and I have developed this plan together and I agree to work on the goals and objectives  I understand the treatment goals that were developed for my treatment      "
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
## 2023-08-08 ENCOUNTER — OFFICE VISIT (OUTPATIENT)
Dept: PSYCHIATRY | Facility: CLINIC | Age: 43
End: 2023-08-08
Payer: COMMERCIAL

## 2023-08-08 VITALS — HEART RATE: 103 BPM | SYSTOLIC BLOOD PRESSURE: 135 MMHG | DIASTOLIC BLOOD PRESSURE: 80 MMHG

## 2023-08-08 DIAGNOSIS — F41.1 GENERALIZED ANXIETY DISORDER: ICD-10-CM

## 2023-08-08 DIAGNOSIS — F90.0 ADHD (ATTENTION DEFICIT HYPERACTIVITY DISORDER), INATTENTIVE TYPE: Primary | ICD-10-CM

## 2023-08-08 PROCEDURE — 99214 OFFICE O/P EST MOD 30 MIN: CPT | Performed by: STUDENT IN AN ORGANIZED HEALTH CARE EDUCATION/TRAINING PROGRAM

## 2023-08-08 PROCEDURE — 90833 PSYTX W PT W E/M 30 MIN: CPT | Performed by: STUDENT IN AN ORGANIZED HEALTH CARE EDUCATION/TRAINING PROGRAM

## 2023-08-08 RX ORDER — METHYLPHENIDATE HYDROCHLORIDE 18 MG/1
18 TABLET ORAL DAILY
Qty: 30 TABLET | Refills: 0 | Status: SHIPPED | OUTPATIENT
Start: 2023-08-08 | End: 2023-09-07

## 2023-08-08 RX ORDER — ATOMOXETINE 40 MG/1
CAPSULE ORAL
Qty: 7 CAPSULE | Refills: 0 | Status: SHIPPED | OUTPATIENT
Start: 2023-08-08

## 2023-08-08 NOTE — PSYCH
MEDICATION MANAGEMENT NOTE        [facility]      Name and Date of Birth:  Rosangela Steele 36 y.o. 1980 MRN: [id number]    Date of Visit: August 8, 2023    Reason for Visit: Follow-up visit regarding medication management     Chief Complaint: "I still feel like I have trouble completing things"    SUBJECTIVE:    Rosangela Steele is a 36 y.o., female, possessing a past psychiatric history significant for concentration deficit, generalized anxiety disorder who presents for follow-up appointment for medication management. Carri Lucas states that since their previous outpatient psychiatric appointment, she feels that not much has changed. She reports that she has ongoing trouble with concentration and focus. States that she becomes distracted, has difficulty organizing and completing tasks. Describes that in her home there are various projects and things that have been started but not completed. She is a chronic procrastinator, has trouble motivating and organizing herself to complete things. She is now working fully remote from home, states that she will sometimes fall behind in work because she has trouble reminding herself to complete things in a timely manner. Fortunately it has not resulted in any issues or being reprimanded at work. She states that the increase in the Strattera did not have any significant changes in her concentration or focus abilities. She states that anxiety symptoms still are well maintained. No significant ruminations fears or panic attacks. Sleep and appetite are relatively stable. Energy levels are appropriate. No reported SI or HI at this time.     Current Rating Scores:   Current PHQ-9   PHQ-2/9 Depression Screening    Little interest or pleasure in doing things: 0 - not at all  Feeling down, depressed, or hopeless: 0 - not at all  Trouble falling or staying asleep, or sleeping too much: 1 - several days  Feeling tired or having little energy: 3 - nearly every day  Poor appetite or overeatin - several days  Feeling bad about yourself - or that you are a failure or have let yourself or your family down: 0 - not at all  Trouble concentrating on things, such as reading the newspaper or watching television: 2 - more than half the days  Moving or speaking so slowly that other people could have noticed. Or the opposite - being so fidgety or restless that you have been moving around a lot more than usual: 0 - not at all         Psychiatric Review Of Systems:    Appetite: no  Adverse eating: no  Weight changes: no  Insomnia/sleeplessness: no  Fatigue/anergy: decreased  Anhedonia/lack of interest: no  Attention/concentration: decreased  Psychomotor agitation/retardation: no  Somatic symptoms: no  Anxiety/panic attack: no  Heidy/hypomania: no  Hopelessness/helplessness/worthlessness: no  Self-injurious behavior/high-risk behavior: no  Suicidal ideation: no  Homicidal ideation: no  Auditory hallucinations: no  Visual hallucinations: no  Other perceptual disturbances: no  Delusional thinking: no  Obsessive/compulsive symptoms: no    Review Of Systems:      Constitutional negative   ENT negative   Cardiovascular negative   Respiratory negative   Gastrointestinal negative   Genitourinary negative   Musculoskeletal negative   Integumentary negative   Neurological negative   Endocrine negative   Other Symptoms none, all other systems are negative     History Review: The following portions of the patient's history were reviewed and updated as appropriate: allergies, current medications, past family history, past medical history, past social history, past surgical history, and problem list. Previous progress notes/assessments from other providers reviewed as available. Past Medical History:    No past medical history on file.      No Known Allergies    Substance Abuse History:    Social History     Substance and Sexual Activity Alcohol Use Not Currently     Social History     Substance and Sexual Activity   Drug Use Not on file       Social History:    Social History     Socioeconomic History   • Marital status: Single     Spouse name: Not on file   • Number of children: Not on file   • Years of education: Not on file   • Highest education level: Not on file   Occupational History   • Not on file   Tobacco Use   • Smoking status: Former     Packs/day: 1.00     Years: 20.00     Total pack years: 20.00     Types: Cigarettes     Start date: 55     Quit date: 2016     Years since quittin.2   • Smokeless tobacco: Never   Vaping Use   • Vaping Use: Never used   Substance and Sexual Activity   • Alcohol use: Not Currently   • Drug use: Not on file   • Sexual activity: Not on file   Other Topics Concern   • Not on file   Social History Narrative   • Not on file     Social Determinants of Health     Financial Resource Strain: Not on file   Food Insecurity: Not on file   Transportation Needs: Not on file   Physical Activity: Not on file   Stress: Not on file   Social Connections: Not on file   Intimate Partner Violence: Not on file   Housing Stability: Not on file       Family Psychiatric History:     Family History   Problem Relation Age of Onset   • Hypertension Mother    • Heart disease Father    • Diabetes Father    • Kidney disease Paternal Grandmother    • Heart attack Paternal Grandfather             OBJECTIVE:     Vital signs in last 24 hours:    Vitals:    23 1625   BP: 135/80   Pulse: 103       Mental Status Evaluation:    Appearance age appropriate, casually dressed   Behavior cooperative, calm   Speech normal rate, normal volume, normal pitch   Mood euthymic   Affect normal range and intensity, appropriate   Thought Processes organized, goal directed   Associations intact associations   Thought Content no overt delusions   Perceptual Disturbances: no auditory hallucinations, no visual hallucinations   Abnormal Thoughts  Risk Potential Suicidal ideation - None  Homicidal ideation - None  Potential for aggression - No   Orientation oriented to person, place, time/date and situation   Memory recent and remote memory grossly intact   Consciousness alert and awake   Attention Span Concentration Span decreased attention span  decreased concentration   Intellect appears to be of average intelligence   Insight intact   Judgement intact   Muscle Strength and  Gait normal muscle strength and normal muscle tone, normal gait and normal balance   Motor activity no abnormal movements   Fund of Knowledge adequate knowledge of current events  adequate fund of knowledge regarding past history  adequate fund of knowledge regarding vocabulary    Pain none   Pain Scale 0       Laboratory Results: I have personally reviewed all pertinent laboratory/tests results    Recent Labs (last 4 months):   No visits with results within 4 Month(s) from this visit. Latest known visit with results is:   No results found for any previous visit. Suicide/Homicide Risk Assessment:    The following interventions are recommended: no intervention changes needed. Although patient's acute lethality risk is low, long-term/chronic lethality risk is mildly elevated in the presence of current diagnoses. At the current moment, Fortunato Henry is future-oriented, forward-thinking, and demonstrates ability to act in a self-preserving manner as evidenced by volitionally presenting to the clinic today, seeking treatment. To mitigate future risk, patient should adhere to the recommendations below, avoid alcohol/illicit substance use, utilize community-based resources and familiar support and prioritize mental health treatment. Presently, patient denies active suicidal/homicidal ideation in addition to thoughts of self-injury; contracts for safety. At conclusion of evaluation, patient is amenable to the recommendations below.  Patient is amenable to calling/contacting the outpatient office including this writer if any acute adverse effects of their medication regimen arise in addition to any comments or concerns pertaining to their psychiatric management. Patient is amenable to calling/contacting crisis and/or attending to the nearest emergency department if their clinical condition deteriorates to assure their safety and stability, stating that they are able to appropriately confide in their family regarding their psychiatric state. Assessment/Plan:     Diony Painting is a 44-year-old female who carries a past psychiatric history significant for ISSAC, adjustment disorder, possible ADHD that presents as a transfer of care from [facility]. Reporting history of anxiety and poor concentration through majority of her life although only sought treatment in 2021 during the COVID-19 pandemic. At this time she was also struggling with some medical issues with her father. She has had long standing inattentive symptoms as well as feeling of tension/restlessness and being on edge. She started medication treatments, felt that Zoloft has been effective in reducing anxiety symptoms and found that Strattera has been helpful for improving attention and focus. She is currently pending neuropsychological testing.    5/26/23 - Still struggling with inattention and focus issues. Some improvement with Strattera. Amenable to further titration of this today to better target focus. In the future we will consider increasing Zoloft to help with anxiety symptoms which could also be impacting her concentration. Encouraged completion of neuropsychological testing to better characterize her diagnosis. 8/8/23 - She is still having difficulty with focus and concentration. Inattention has not improved with the increase in Strattera. She describes clear history consistent with ADHD inattentive type.   Previously recommended neuropsychological testing by last provider although never completed this. She states that she frequently forgets to do so. I do feel at this point that ADHD symptoms are having significant impact on daily functioning and she would benefit from stimulant medication. Discussed the potential risks and side effect profile associated with this including potential for appetite suppression, cardiac side effects, weight loss. She expressed understanding of this. Amenable to reaching out to the clinic if any such symptoms were to occur. We will taper the Strattera to 40 mg for 1 week and then discontinue. Start low-dose of Concerta 18 mg daily and then titrate as needed. DSM-5 Diagnoses:     • Attention deficit hyperactivity disorder, inattentive type  • ISSAC  • Adjustment disorder    • Reduce Strattera to 40 mg for 1 week and then discontinue. • Start Concerta 43BI daily for ADHD. • Zoloft 50 mg daily for anxiety. • Medication management every 1.5 months  • Aware of need to follow up with family physician for medical issues  • Aware of 24 hour and weekend coverage for urgent situations accessed by calling [facility] main practice number    Medications Risks/Benefits      Risks, Benefits And Possible Side Effects Of Medications:    Risks, benefits, and possible side effects of medications explained to Hemalatha including risk of suicidality and serotonin syndrome related to treatment with antidepressants and risks of cardiovascular side effects including elevated blood pressure, risk of misuse, abuse or dependence and risk of increased anxiety related to treatment with stimulant medications. She verbalizes understanding and agreement for treatment. .    Controlled Medication Discussion:     Hemalatha has been filling controlled prescriptions on time as prescribed according to [address] Program  Discussed with Hemalatha the risks of sedation, respiratory depression, impairment of ability to drive and potential for abuse and addiction related to treatment with benzodiazepine medications. She understands risk of treatment with benzodiazepine medications, agrees to not drive if feels impaired and agrees to take medications as prescribed  Discussed with Kimberly Varma Box warning on concurrent use of benzodiazepines and opioid medications including sedation, respiratory depression, coma and death. She understands the risk of treatment with benzodiazepines in addition to opioids and wants to continue taking those medications    Psychotherapy Provided:     Individual psychotherapy provided: Yes  Counseling was provided during the session today for 16 minutes. Recent stressor including occupational problems, job stress, stress at work, recent medication change and everyday stressors discussed with Chelsea Palmer. Coping skills reviewed with Chelsea Palmer. Reassurance and supportive therapy provided.       Treatment Plan:    Completed and signed during the session: Not applicable - Treatment Plan not due at this session    This note was not shared with the patient due to reasonable likelihood of causing patient harm    Visit Time    Visit Start Time: 4PM  Visit Stop Time: 4:30PM  Total Visit Duration: 30 minutes        Sneha Jones MD 08/08/23

## 2023-09-19 DIAGNOSIS — R41.840 CONCENTRATION DEFICIT: ICD-10-CM

## 2023-09-19 DIAGNOSIS — F90.0 ADHD (ATTENTION DEFICIT HYPERACTIVITY DISORDER), INATTENTIVE TYPE: ICD-10-CM

## 2023-09-19 DIAGNOSIS — F43.21 ADJUSTMENT DISORDER WITH DEPRESSED MOOD: ICD-10-CM

## 2023-09-19 RX ORDER — METHYLPHENIDATE HYDROCHLORIDE 18 MG/1
18 TABLET ORAL DAILY
Qty: 30 TABLET | Refills: 0 | Status: SHIPPED | OUTPATIENT
Start: 2023-09-19 | End: 2023-09-28

## 2023-09-28 ENCOUNTER — OFFICE VISIT (OUTPATIENT)
Dept: PSYCHIATRY | Facility: CLINIC | Age: 43
End: 2023-09-28
Payer: COMMERCIAL

## 2023-09-28 VITALS — SYSTOLIC BLOOD PRESSURE: 132 MMHG | DIASTOLIC BLOOD PRESSURE: 84 MMHG | HEART RATE: 91 BPM

## 2023-09-28 DIAGNOSIS — F90.0 ADHD (ATTENTION DEFICIT HYPERACTIVITY DISORDER), INATTENTIVE TYPE: ICD-10-CM

## 2023-09-28 DIAGNOSIS — F41.1 GENERALIZED ANXIETY DISORDER: Primary | ICD-10-CM

## 2023-09-28 PROCEDURE — 90833 PSYTX W PT W E/M 30 MIN: CPT | Performed by: STUDENT IN AN ORGANIZED HEALTH CARE EDUCATION/TRAINING PROGRAM

## 2023-09-28 PROCEDURE — 99214 OFFICE O/P EST MOD 30 MIN: CPT | Performed by: STUDENT IN AN ORGANIZED HEALTH CARE EDUCATION/TRAINING PROGRAM

## 2023-09-28 RX ORDER — METHYLPHENIDATE HYDROCHLORIDE 36 MG/1
36 TABLET ORAL DAILY
Qty: 30 TABLET | Refills: 0 | Status: SHIPPED | OUTPATIENT
Start: 2023-09-28 | End: 2023-10-28

## 2023-09-28 NOTE — PSYCH
MEDICATION MANAGEMENT NOTE        [facility]      Name and Date of Birth:  Susan Forman 39 y.o. 1980 MRN: [id number]    Date of Visit: 2023    Reason for Visit: Follow-up visit regarding medication management     Chief Complaint: "I am doing a little better"    SUBJECTIVE:    Susan Forman is a 39 y.o., female, with a past psychiatric history significant for concentration deficit, generalized anxiety disorder, who presents for follow-up appointment for medication management. Siri Jones states that since their previous outpatient psychiatric appointment, she is doing slightly better. She reports that she did not experience any negative side effects with initiation of Concerta. Notes that she has had more energy during the day and is better able to concentrate/focus. She feels that she is more productive and completing tasks in a timely manner. She states that she has been finishing some projects at home that she has not done in many years. She reports that she also has been having more energy during the day and has not needed to nap as frequently. Reports the only side effect she has noticed in some instances is difficulty initiating sleep at night although this is not majorly problematic and she is still sleeping a good 6 to 7 hours per night. Appetite levels are stable. No appetite suppression. No tachycardia or palpitations reported. Otherwise denies any SI, HI, AVH.     Current Rating Scores:   Current PHQ-9   PHQ-2/9 Depression Screening    Little interest or pleasure in doing things: 0 - not at all  Feeling down, depressed, or hopeless: 0 - not at all  Trouble falling or staying asleep, or sleeping too much: 1 - several days  Feeling tired or having little energy: 1 - several days  Poor appetite or overeatin - not at all  Feeling bad about yourself - or that you are a failure or have let yourself or your family down: 0 - not at all  Trouble concentrating on things, such as reading the newspaper or watching television: 1 - several days  Moving or speaking so slowly that other people could have noticed. Or the opposite - being so fidgety or restless that you have been moving around a lot more than usual: 0 - not at all         Psychiatric Review Of Systems:    Appetite: no  Adverse eating: no  Weight changes: no  Insomnia/sleeplessness: no  Fatigue/anergy: no  Anhedonia/lack of interest: no  Attention/concentration: no, improving though  Psychomotor agitation/retardation: no  Somatic symptoms: no  Anxiety/panic attack: no  Heidy/hypomania: no  Hopelessness/helplessness/worthlessness: no  Self-injurious behavior/high-risk behavior: no  Suicidal ideation: no  Homicidal ideation: no  Auditory hallucinations: no  Visual hallucinations: no  Other perceptual disturbances: no  Delusional thinking: no  Obsessive/compulsive symptoms: no    Review Of Systems:      Constitutional negative   ENT negative   Cardiovascular negative   Respiratory negative   Gastrointestinal negative   Genitourinary negative   Musculoskeletal negative   Integumentary negative   Neurological negative   Endocrine negative   Other Symptoms none, all other systems are negative     History Review: The following portions of the patient's history were reviewed and updated as appropriate: allergies, current medications, past family history, past medical history, past social history, past surgical history, and problem list. Previous progress notes/assessments from other providers reviewed as available. Past Medical History:    No past medical history on file.      No Known Allergies    Substance Abuse History:    Social History     Substance and Sexual Activity   Alcohol Use Not Currently     Social History     Substance and Sexual Activity   Drug Use Not on file       Social History:    Social History     Socioeconomic History   • Marital status: Single     Spouse name: Not on file   • Number of children: Not on file   • Years of education: Not on file   • Highest education level: Not on file   Occupational History   • Not on file   Tobacco Use   • Smoking status: Former     Packs/day: 1.00     Years: 20.00     Total pack years: 20.00     Types: Cigarettes     Start date: 55     Quit date: 2016     Years since quittin.3   • Smokeless tobacco: Never   Vaping Use   • Vaping Use: Never used   Substance and Sexual Activity   • Alcohol use: Not Currently   • Drug use: Not on file   • Sexual activity: Not on file   Other Topics Concern   • Not on file   Social History Narrative   • Not on file     Social Determinants of Health     Financial Resource Strain: Not on file   Food Insecurity: Not on file   Transportation Needs: Not on file   Physical Activity: Not on file   Stress: Not on file   Social Connections: Not on file   Intimate Partner Violence: Not on file   Housing Stability: Not on file       Family Psychiatric History:     Family History   Problem Relation Age of Onset   • Hypertension Mother    • Heart disease Father    • Diabetes Father    • Kidney disease Paternal Grandmother    • Heart attack Paternal Grandfather             OBJECTIVE:     Vital signs in last 24 hours:    Vitals:    23 1546   BP: 132/84   Pulse: 91       Mental Status Evaluation:    Appearance age appropriate, casually dressed   Behavior cooperative, calm   Speech normal rate, normal volume, normal pitch, hypertalkative   Mood euthymic   Affect normal range and intensity, appropriate   Thought Processes organized, goal directed   Associations intact associations   Thought Content no overt delusions   Perceptual Disturbances: no auditory hallucinations, no visual hallucinations   Abnormal Thoughts  Risk Potential Suicidal ideation - None  Homicidal ideation - None  Potential for aggression - No   Orientation oriented to person, place, time/date and situation   Memory recent and remote memory grossly intact   Consciousness alert and awake   Attention Span Concentration Span attention span and concentration are age appropriate   Intellect appears to be of average intelligence   Insight intact   Judgement intact   Muscle Strength and  Gait normal muscle strength and normal muscle tone, normal gait and normal balance   Motor activity no abnormal movements   Fund of Knowledge adequate knowledge of current events  adequate fund of knowledge regarding past history  adequate fund of knowledge regarding vocabulary    Pain none   Pain Scale 0       Laboratory Results: I have personally reviewed all pertinent laboratory/tests results    Recent Labs (last 4 months):   No visits with results within 4 Month(s) from this visit. Latest known visit with results is:   No results found for any previous visit. Suicide/Homicide Risk Assessment:    The following interventions are recommended: no intervention changes needed. Although patient's acute lethality risk is low, long-term/chronic lethality risk is mildly elevated in the presence of current diagnoses. At the current moment, Burton Corado is future-oriented, forward-thinking, and demonstrates ability to act in a self-preserving manner as evidenced by volitionally presenting to the clinic today, seeking treatment. To mitigate future risk, patient should adhere to the recommendations below, avoid alcohol/illicit substance use, utilize community-based resources and familiar support and prioritize mental health treatment. Presently, patient denies active suicidal/homicidal ideation in addition to thoughts of self-injury; contracts for safety. At conclusion of evaluation, patient is amenable to the recommendations below. Patient is amenable to calling/contacting the outpatient office including this writer if any acute adverse effects of their medication regimen arise in addition to any comments or concerns pertaining to their psychiatric management. Patient is amenable to calling/contacting crisis and/or attending to the nearest emergency department if their clinical condition deteriorates to assure their safety and stability, stating that they are able to appropriately confide in their family regarding their psychiatric state. Assessment/Plan:     Korey Pak is a 44-year-old female who carries a past psychiatric history significant for ISSAC, adjustment disorder, possible ADHD that presents as a transfer of care from [facility]. Reporting history of anxiety and poor concentration through majority of her life although only sought treatment in 2021 during the COVID-19 pandemic. At this time she was also struggling with some medical issues with her father. She has had long standing inattentive symptoms as well as feeling of tension/restlessness and being on edge. She started medication treatments, felt that Zoloft has been effective in reducing anxiety symptoms and found that Strattera has been helpful for improving attention and focus. She is currently pending neuropsychological testing.    5/26/23 - Still struggling with inattention and focus issues. Some improvement with Strattera. Amenable to further titration of this today to better target focus. In the future we will consider increasing Zoloft to help with anxiety symptoms which could also be impacting her concentration. Encouraged completion of neuropsychological testing to better characterize her diagnosis. 8/8/23 - She is still having difficulty with focus and concentration. Inattention has not improved with the increase in Strattera. She describes clear history consistent with ADHD inattentive type. Previously recommended neuropsychological testing by last provider although never completed this. She states that she frequently forgets to do so.   I do feel at this point that ADHD symptoms are having significant impact on daily functioning and she would benefit from stimulant medication. Discussed the potential risks and side effect profile associated with this including potential for appetite suppression, cardiac side effects, weight loss. She expressed understanding of this. Amenable to reaching out to the clinic if any such symptoms were to occur. We will taper the Strattera to 40 mg for 1 week and then discontinue. Start low-dose of Concerta 18 mg daily and then titrate as needed. 9/28/23 She is doing better with the Concerta; focus gradually improved. Better attentive and motivated. Feels there is still room for improvement though. No side effects from the medications. Will titrate to Concerta 13EH and monitor - advised to reach out with issues or concerns. DSM-5 Diagnoses:     • Attention deficit hyperactivity disorder, inattentive type  • ISSAC  • Adjustment disorder    • Increase  Concerta to 65OM daily for ADHD. • Zoloft 50 mg daily for anxiety. • Medication management every 3 months  • Aware of need to follow up with family physician for medical issues  • Aware of 24 hour and weekend coverage for urgent situations accessed by calling [facility] main practice number    Medications Risks/Benefits      Risks, Benefits And Possible Side Effects Of Medications:    Risks, benefits, and possible side effects of medications explained to Hemalatha including risks of cardiovascular side effects including elevated blood pressure, risk of misuse, abuse or dependence and risk of increased anxiety related to treatment with stimulant medications. She verbalizes understanding and agreement for treatment. .    Controlled Medication Discussion:     Hemalatha has been filling controlled prescriptions on time as prescribed according to [facility] Monitoring Program    Psychotherapy Provided:     Individual psychotherapy provided: Yes  Counseling was provided during the session today for 16 minutes.   Recent stressor including occupational problems, job stress, problems at work, recent medication change, everyday stressors and occasional anxiety discussed with Hemalatha. Coping skills reviewed with Hemalatha. Reassurance and supportive therapy provided.       Treatment Plan:    Completed and signed during the session: Not applicable - Treatment Plan not due at this session    This note was not shared with the patient due to reasonable likelihood of causing patient harm    Visit Time    Visit Start Time: 330PM  Visit Stop Time: 3:55PM  Total Visit Duration: 25 minutes        Rafi Clifton MD 09/28/23

## 2023-10-16 ENCOUNTER — TELEPHONE (OUTPATIENT)
Dept: PSYCHIATRY | Facility: CLINIC | Age: 43
End: 2023-10-16

## 2023-10-16 DIAGNOSIS — F90.0 ADHD (ATTENTION DEFICIT HYPERACTIVITY DISORDER), INATTENTIVE TYPE: ICD-10-CM

## 2023-10-16 RX ORDER — METHYLPHENIDATE HYDROCHLORIDE 36 MG/1
36 TABLET ORAL DAILY
Qty: 30 TABLET | Refills: 0 | Status: SHIPPED | OUTPATIENT
Start: 2023-10-16 | End: 2023-11-15

## 2023-10-16 NOTE — TELEPHONE ENCOUNTER
Patient called regarding refill on new medication Concerta dosage. She mention that during her last follow up appointment provider was going to send in a higher dosage of medication. Patient stated that she hasn't received a call from anyone and she said that the pharmacy was incorrect on file.  Patient would like to use the [facility] [address]

## 2023-11-14 DIAGNOSIS — F90.0 ADHD (ATTENTION DEFICIT HYPERACTIVITY DISORDER), INATTENTIVE TYPE: ICD-10-CM

## 2023-11-14 RX ORDER — METHYLPHENIDATE HYDROCHLORIDE 36 MG/1
36 TABLET ORAL DAILY
Qty: 30 TABLET | Refills: 0 | Status: SHIPPED | OUTPATIENT
Start: 2023-11-14 | End: 2023-12-14

## 2023-11-14 NOTE — TELEPHONE ENCOUNTER
Medication Refill Request     Name of Medication Concerta  Dose/Frequency 36MG Take 1 Tablet  Quantity 30 Tablets  Verified pharmacy   [x]  Verified ordering Provider   [x]  Does patient have enough for the next 3 days? Yes [] No [x]  Does patient have a follow-up appointment scheduled?  Yes [x] No []   If so when is appointment: 1/2/24

## 2023-12-19 DIAGNOSIS — F90.0 ADHD (ATTENTION DEFICIT HYPERACTIVITY DISORDER), INATTENTIVE TYPE: ICD-10-CM

## 2023-12-19 DIAGNOSIS — F43.21 ADJUSTMENT DISORDER WITH DEPRESSED MOOD: ICD-10-CM

## 2023-12-19 DIAGNOSIS — R41.840 CONCENTRATION DEFICIT: ICD-10-CM

## 2023-12-19 RX ORDER — METHYLPHENIDATE HYDROCHLORIDE 36 MG/1
36 TABLET ORAL DAILY
Qty: 30 TABLET | Refills: 0 | Status: SHIPPED | OUTPATIENT
Start: 2023-12-19 | End: 2023-12-20 | Stop reason: SDUPTHER

## 2023-12-19 NOTE — TELEPHONE ENCOUNTER
Medication Refill Request     Name of Medication Concerta ER  Dose/Frequency 36MG Take 1 tablet by mouth daily  Quantity 30 Tablets  Verified pharmacy   [x]  Verified ordering Provider   [x]  Does patient have enough for the next 3 days? Yes [] No [x]  Does patient have a follow-up appointment scheduled? Yes [x] No []   If so when is appointment: 1/2/24    Medication Refill Request     Name of Medication Zoloft  Dose/Frequency 50MG  Take 1 Tablet by mouth daily  Quantity 90 Tablets  Verified pharmacy   [x]  Verified ordering Provider   [x]  Does patient have enough for the next 3 days? Yes [] No [x]  Does patient have a follow-up appointment scheduled? Yes [x] No []   If so when is appointment: 1/2/24

## 2023-12-20 DIAGNOSIS — F90.0 ADHD (ATTENTION DEFICIT HYPERACTIVITY DISORDER), INATTENTIVE TYPE: ICD-10-CM

## 2023-12-20 RX ORDER — METHYLPHENIDATE HYDROCHLORIDE 36 MG/1
36 TABLET, EXTENDED RELEASE ORAL DAILY
Qty: 30 TABLET | Refills: 0 | Status: SHIPPED | OUTPATIENT
Start: 2023-12-20 | End: 2024-01-19

## 2023-12-20 NOTE — TELEPHONE ENCOUNTER
Patient called said the pharmacy does not have generic brand for Concerta asked if you can resend prescription with name brand name

## 2024-01-02 ENCOUNTER — OFFICE VISIT (OUTPATIENT)
Dept: PSYCHIATRY | Facility: CLINIC | Age: 44
End: 2024-01-02
Payer: COMMERCIAL

## 2024-01-02 DIAGNOSIS — F90.0 ADHD (ATTENTION DEFICIT HYPERACTIVITY DISORDER), INATTENTIVE TYPE: ICD-10-CM

## 2024-01-02 DIAGNOSIS — F41.1 GENERALIZED ANXIETY DISORDER: Primary | ICD-10-CM

## 2024-01-02 PROCEDURE — 90833 PSYTX W PT W E/M 30 MIN: CPT | Performed by: STUDENT IN AN ORGANIZED HEALTH CARE EDUCATION/TRAINING PROGRAM

## 2024-01-02 PROCEDURE — 99214 OFFICE O/P EST MOD 30 MIN: CPT | Performed by: STUDENT IN AN ORGANIZED HEALTH CARE EDUCATION/TRAINING PROGRAM

## 2024-01-02 RX ORDER — METHYLPHENIDATE HYDROCHLORIDE 36 MG/1
36 TABLET ORAL DAILY
Qty: 30 TABLET | Refills: 0 | Status: SHIPPED | OUTPATIENT
Start: 2024-01-17 | End: 2024-02-16

## 2024-01-02 NOTE — BH CRISIS PLAN
"Client Name: Coby Appiah       Client YOB: 1980  : 1980    Treatment Team (include name and contact information):         Psychiatrist: Colt Van MD      Healthcare Provider  Dony Marie DO  [address] [place] [address]  [phone]    Type of Plan   * Child plans (children 14 yo and younger) must be completed and signed by the child's legal guardian   * Plans for all individuals 13 yo and above must be signed by the client.     Plan Type: adolescent/adult (14 and over) Initial      My Personal Strengths are (in the client's own words):  \"Honesty\"    The stressors and triggers that may put me at risk are:  Extended family; father and pet's health    Coping skills I can use to keep myself calm and safe:  Other (describe) focus on other things and recollect self, playing with animals    Coping skills/supports I can use to maintain abstinence from substance use:   Not Applicable    The people that provide me with help and support: (Include name, contact, and how they can help)   Support person #1: My mom    * Phone number: in cell phone    * How can they help me? Talk and reason stressors      In the past, the following has helped me in times of crisis:    Calling a family member      If it is an emergency and you need immediate help, call     If there is a possibility of danger to yourself or others, call the following crisis hotline resources:     Adult Crisis Numbers  Suicide Prevention Hotline - Dial   [facility]: 610.482.5106  [facility]: [phone]  [facility]: [phone]  [facility]: [phone]  [place]: [phone]  [facility]: [phone]  [facility]: [phone]  [facility]: [phone] (daytime).       [phone] (after hours, weekends, holidays)     Child/Adolescent Crisis Numbers   [facility]: [phone]   [facility]: [phone]   [facility]: " [phone]   [place]: [phone]    Please note: Some counties do not have a separate number for Child/Adolescent specific crisis. If your county is not listed under Child/Adolescent, please call the adult number for your county     National Talk to Text Line   All Ages - [phone]    In the event your feelings become unmanageable, and you cannot reach your support system, you will call 911 immediately or go to the nearest hospital emergency room.

## 2024-01-02 NOTE — PSYCH
"MEDICATION MANAGEMENT NOTE        [facility]      Name and Date of Birth:  Coby Appiah 43 y.o. 1980 MRN: [id number]    Date of Visit: 2024    Reason for Visit: Follow-up visit regarding medication management     Chief Complaint: \"I am doing pretty good, the medication is helping\"    SUBJECTIVE:    Coby Appiah is a 43 y.o., female, with a past psychiatric history significant for ADHD, generalized anxiety disorder, who presents for follow-up appointment for medication management. Coby states that since their previous outpatient psychiatric appointment, she is doing fairly well.  She reports that her focus and concentration is much improved with the Concerta.  She has been able to focus and concentrate for longer periods of time.  She does not get distracted is easier and finds that motivation and starting tasks is less difficult.  She feels more productive overall, mostly interpersonal life but some improvements minorly in her work life as well.  No negative symptoms reported to the medications.  She feels that she is not experiencing any tachycardia or palpitations.  No cardiac symptoms reported.  Appetite levels are stable and adequate, no depression noted.  Adamantly denying any SI, HI, AVH.  No significant depressive symptoms at this time.  Feels that anxiety is well-controlled.    Current Rating Scores:   Current PHQ-9   PHQ-2/9 Depression Screening    Little interest or pleasure in doing things: 0 - not at all  Feeling down, depressed, or hopeless: 1 - several days  Trouble falling or staying asleep, or sleeping too much: 1 - several days  Feeling tired or having little energy: 1 - several days  Poor appetite or overeatin - not at all  Feeling bad about yourself - or that you are a failure or have let yourself or your family down: 0 - not at all  Trouble concentrating on things, such as reading the newspaper or watching " television: 0 - not at all  Moving or speaking so slowly that other people could have noticed. Or the opposite - being so fidgety or restless that you have been moving around a lot more than usual: 0 - not at all         Psychiatric Review Of Systems:    Appetite: no  Adverse eating: no  Weight changes: no  Insomnia/sleeplessness: no  Fatigue/anergy: no  Anhedonia/lack of interest: no  Attention/concentration:  better  Psychomotor agitation/retardation: no  Somatic symptoms: no  Anxiety/panic attack: no  Heidy/hypomania: no  Hopelessness/helplessness/worthlessness: no  Self-injurious behavior/high-risk behavior: no  Suicidal ideation: no  Homicidal ideation: no  Auditory hallucinations: no  Visual hallucinations: no  Other perceptual disturbances: no  Delusional thinking: no  Obsessive/compulsive symptoms: no    Review Of Systems:      Constitutional negative   ENT negative   Cardiovascular negative   Respiratory negative   Gastrointestinal negative   Genitourinary negative   Musculoskeletal negative   Integumentary negative   Neurological negative   Endocrine negative   Other Symptoms none, all other systems are negative     History Review:   The following portions of the patient's history were reviewed and updated as appropriate: allergies, current medications, past family history, past medical history, past social history, past surgical history, and problem list. Previous progress notes/assessments from other providers reviewed as available.    Past Medical History:    History reviewed. No pertinent past medical history.     No Known Allergies    Substance Abuse History:    Social History     Substance and Sexual Activity   Alcohol Use Not Currently     Social History     Substance and Sexual Activity   Drug Use Not on file       Social History:    Social History     Socioeconomic History    Marital status: Single     Spouse name: Not on file    Number of children: Not on file    Years of education: Not on file     Highest education level: Not on file   Occupational History    Not on file   Tobacco Use    Smoking status: Former     Current packs/day: 0.00     Average packs/day: 1 pack/day for 20.4 years (20.4 ttl pk-yrs)     Types: Cigarettes     Start date:      Quit date: 2016     Years since quittin.6    Smokeless tobacco: Never   Vaping Use    Vaping status: Never Used   Substance and Sexual Activity    Alcohol use: Not Currently    Drug use: Not on file    Sexual activity: Not on file   Other Topics Concern    Not on file   Social History Narrative    Not on file     Social Determinants of Health     Financial Resource Strain: Not on file   Food Insecurity: Not on file   Transportation Needs: Not on file   Physical Activity: Not on file   Stress: Not on file   Social Connections: Not on file   Intimate Partner Violence: Not on file   Housing Stability: Not on file       Family Psychiatric History:     Family History   Problem Relation Age of Onset    Hypertension Mother     Heart disease Father     Diabetes Father     Kidney disease Paternal Grandmother     Heart attack Paternal Grandfather             OBJECTIVE:     Vital signs in last 24 hours:    There were no vitals filed for this visit.    Mental Status Evaluation:    Appearance age appropriate, casually dressed   Behavior cooperative, calm   Speech normal rate, normal volume, normal pitch   Mood euthymic   Affect normal range and intensity, appropriate   Thought Processes organized, goal directed   Associations intact associations   Thought Content no overt delusions   Perceptual Disturbances: no auditory hallucinations, no visual hallucinations   Abnormal Thoughts  Risk Potential Suicidal ideation - None  Homicidal ideation - None  Potential for aggression - No   Orientation oriented to person, place, time/date, and situation   Memory recent and remote memory grossly intact   Consciousness alert and awake   Attention Span Concentration Span  attention span and concentration are age appropriate   Intellect appears to be of average intelligence   Insight intact   Judgement intact   Muscle Strength and  Gait normal muscle strength and normal muscle tone, normal gait and normal balance   Motor activity no abnormal movements   Fund of Knowledge adequate knowledge of current events  adequate fund of knowledge regarding past history  adequate fund of knowledge regarding vocabulary    Pain none   Pain Scale 0       Laboratory Results: I have personally reviewed all pertinent laboratory/tests results    Recent Labs (last 4 months):   No visits with results within 4 Month(s) from this visit.   Latest known visit with results is:   No results found for any previous visit.       Suicide/Homicide Risk Assessment:    The following interventions are recommended: no intervention changes needed.     Although patient's acute lethality risk is low, long-term/chronic lethality risk is mildly elevated in the presence of current diagnoses.   At the current moment, Coby is future-oriented, forward-thinking, and demonstrates ability to act in a self-preserving manner as evidenced by volitionally presenting to the clinic today, seeking treatment. To mitigate future risk, patient should adhere to the recommendations below, avoid alcohol/illicit substance use, utilize community-based resources and familiar support and prioritize mental health treatment. Presently, patient denies active suicidal/homicidal ideation in addition to thoughts of self-injury; contracts for safety. Patient is amenable to calling/contacting the outpatient office including this writer if any acute adverse effects of their medication regimen arise in addition to any comments or concerns pertaining to their psychiatric management.  Patient is amenable to calling/contacting crisis and/or attending to the nearest emergency department if their clinical condition deteriorates to assure their safety and  stability, stating that they are able to appropriately confide in their family regarding their psychiatric state.    Assessment/Plan:     Coby Appiah is a 42-year-old female who carries a past psychiatric history significant for ISSAC, adjustment disorder, possible ADHD that presents as a transfer of care from [facility].  Reporting history of anxiety and poor concentration through majority of her life although only sought treatment in 2021 during the COVID-19 pandemic.  At this time she was also struggling with some medical issues with her father.  She has had long standing inattentive symptoms as well as feeling of tension/restlessness and being on edge.  She started medication treatments, felt that Zoloft has been effective in reducing anxiety symptoms and found that Strattera has been helpful for improving attention and focus.  She is currently pending neuropsychological testing.    5/26/23 - Still struggling with inattention and focus issues.  Some improvement with Strattera.  Amenable to further titration of this today to better target focus.  In the future we will consider increasing Zoloft to help with anxiety symptoms which could also be impacting her concentration.  Encouraged completion of neuropsychological testing to better characterize her diagnosis.    8/8/23 - She is still having difficulty with focus and concentration. Inattention has not improved with the increase in Strattera.  She describes clear history consistent with ADHD inattentive type.  Previously recommended neuropsychological testing by last provider although never completed this.  She states that she frequently forgets to do so.  I do feel at this point that ADHD symptoms are having significant impact on daily functioning and she would benefit from stimulant medication.  Discussed the potential risks and side effect profile associated with this including potential for appetite suppression, cardiac side effects, weight loss.   She expressed understanding of this.  Amenable to reaching out to the clinic if any such symptoms were to occur.  We will taper the Strattera to 40 mg for 1 week and then discontinue.  Start low-dose of Concerta 18 mg daily and then titrate as needed.    9/28/23 She is doing better with the Concerta; focus gradually improved. Better attentive and motivated. Feels there is still room for improvement though. No side effects from the medications. Will titrate to Concerta 36mg and monitor - advised to reach out with issues or concerns.     1/2/24 She is doing well; Concerta is effective and very helpful.  She is tolerating the increased dose without any major side effects or problems.  She feels better able to focus and concentrate and is overall more productive in her personal and work life.  She expressed some concerns about Concerta not being covered by her insurance.  We will reach out and perform prior authorization as she is doing very well with his current medication regimen and I feel that adjusting to an alternative medication would be detrimental to her functioning currently.    DSM-5 Diagnoses:     Attention deficit hyperactivity disorder, inattentive type  ISSAC  Adjustment disorder    Concerta to 36mg daily for ADHD.  Zoloft 50 mg daily for anxiety.  Medication management every 3 months  Aware of need to follow up with family physician for medical issues  Aware of 24 hour and weekend coverage for urgent situations accessed by calling [facility] main practice number    Medications Risks/Benefits      Risks, Benefits And Possible Side Effects Of Medications:    Risks, benefits, and possible side effects of medications explained to Coby including risk of suicidality and serotonin syndrome related to treatment with antidepressants and risks of cardiovascular side effects including elevated blood pressure, risk of misuse, abuse or dependence and risk of increased anxiety related to  treatment with stimulant medications. She verbalizes understanding and agreement for treatment..    Controlled Medication Discussion:     Coby has been filling controlled prescriptions on time as prescribed according to [place] Prescription Drug Monitoring Program    Psychotherapy Provided:     Individual psychotherapy provided: Yes  Counseling was provided during the session today for 16 minutes.  Recent stressor including medical problems, recent medication change, everyday stressors, and occasional anxiety discussed with Coby.   Coping strategies reviewed with Coby.   Reassurance and supportive therapy provided.      Treatment Plan:    Completed and signed during the session: Yes - with Coby    This note was not shared with the patient due to reasonable likelihood of causing patient harm    Visit Time    Visit Start Time: 330PM  Visit Stop Time: 400PM  Total Visit Duration:  30 minutes        Colt Van MD 01/02/24

## 2024-01-02 NOTE — BH TREATMENT PLAN
TREATMENT PLAN (Medication Management Only)        [facility] - [facility]    Name and Date of Birth:  Coby Appiah 43 y.o. 1980  Date of Treatment Plan: January 2, 2024  Diagnosis/Diagnoses:    1. Generalized anxiety disorder    2. ADHD (attention deficit hyperactivity disorder), inattentive type      Strengths/Personal Resources for Self-Care: supportive family, supportive friends, taking medications as prescribed.  Area/Areas of need (in own words): anxiety, ADHD symptoms  1. Long Term Goal: continue improvement in ADHD symptoms.  Target Date:6 months - 7/2/2024  Person/Persons responsible for completion of goal: Coby  2.  Short Term Objective (s) - How will we reach this goal?:   A. Provider new recommended medication/dosage changes and/or continue medication(s): continue current medications as prescribed.  B. Attend medication management appointments regularly.  C. N/A.  Target Date:6 months - 7/2/2024  Person/Persons Responsible for Completion of Goal: Coby  Progress Towards Goals: continuing treatment  Treatment Modality: medication management every 3 months  Review due 180 days from date of this plan: 6 months - 7/2/2024  Expected length of service: maintenance  My Physician/PA/NP and I have developed this plan together and I agree to work on the goals and objectives. I understand the treatment goals that were developed for my treatment.

## 2024-01-03 ENCOUNTER — TELEPHONE (OUTPATIENT)
Dept: PSYCHIATRY | Facility: CLINIC | Age: 44
End: 2024-01-03

## 2024-03-05 DIAGNOSIS — R41.840 CONCENTRATION DEFICIT: ICD-10-CM

## 2024-03-05 DIAGNOSIS — F43.21 ADJUSTMENT DISORDER WITH DEPRESSED MOOD: ICD-10-CM

## 2024-03-05 DIAGNOSIS — F90.0 ADHD (ATTENTION DEFICIT HYPERACTIVITY DISORDER), INATTENTIVE TYPE: ICD-10-CM

## 2024-03-05 RX ORDER — METHYLPHENIDATE HYDROCHLORIDE 36 MG/1
36 TABLET ORAL DAILY
Qty: 30 TABLET | Refills: 0 | Status: SHIPPED | OUTPATIENT
Start: 2024-03-05 | End: 2024-04-04

## 2024-04-22 DIAGNOSIS — F90.0 ADHD (ATTENTION DEFICIT HYPERACTIVITY DISORDER), INATTENTIVE TYPE: ICD-10-CM

## 2024-04-22 RX ORDER — METHYLPHENIDATE HYDROCHLORIDE 36 MG/1
36 TABLET ORAL DAILY
Qty: 30 TABLET | Refills: 0 | Status: SHIPPED | OUTPATIENT
Start: 2024-04-22 | End: 2024-05-22

## 2024-04-22 NOTE — TELEPHONE ENCOUNTER
Medication Refill Request     Name of Medication Concerta ER  Dose/Frequency 36mg Take 1 tablet by mouth daily  Quantity 30 Tablets  Verified pharmacy   [x]  Verified ordering Provider   []  Does patient have enough for the next 3 days? Yes [] No [x]  Does patient have a follow-up appointment scheduled? Yes [x] No []   If so when is appointment: 5/24/24

## 2024-05-28 DIAGNOSIS — F90.0 ADHD (ATTENTION DEFICIT HYPERACTIVITY DISORDER), INATTENTIVE TYPE: ICD-10-CM

## 2024-05-28 DIAGNOSIS — F43.21 ADJUSTMENT DISORDER WITH DEPRESSED MOOD: ICD-10-CM

## 2024-05-28 DIAGNOSIS — R41.840 CONCENTRATION DEFICIT: ICD-10-CM

## 2024-05-28 RX ORDER — METHYLPHENIDATE HYDROCHLORIDE 36 MG/1
36 TABLET ORAL DAILY
Qty: 30 TABLET | Refills: 0 | Status: SHIPPED | OUTPATIENT
Start: 2024-05-28 | End: 2024-06-27

## 2024-06-24 DIAGNOSIS — F90.0 ADHD (ATTENTION DEFICIT HYPERACTIVITY DISORDER), INATTENTIVE TYPE: ICD-10-CM

## 2024-06-24 RX ORDER — METHYLPHENIDATE HYDROCHLORIDE 36 MG/1
36 TABLET ORAL DAILY
Qty: 30 TABLET | Refills: 0 | Status: SHIPPED | OUTPATIENT
Start: 2024-06-24 | End: 2024-07-24

## 2024-08-23 DIAGNOSIS — F43.21 ADJUSTMENT DISORDER WITH DEPRESSED MOOD: ICD-10-CM

## 2024-08-23 DIAGNOSIS — R41.840 CONCENTRATION DEFICIT: ICD-10-CM

## 2024-08-23 DIAGNOSIS — F90.0 ADHD (ATTENTION DEFICIT HYPERACTIVITY DISORDER), INATTENTIVE TYPE: ICD-10-CM

## 2024-08-23 RX ORDER — METHYLPHENIDATE HYDROCHLORIDE 36 MG/1
36 TABLET ORAL DAILY
Qty: 30 TABLET | Refills: 0 | Status: SHIPPED | OUTPATIENT
Start: 2024-08-23 | End: 2024-09-22

## 2024-08-23 NOTE — TELEPHONE ENCOUNTER
Medication:adderall  06/25/2024 06/24/2024 Methylphenidate Hcl (Tablet, Extended Release) 30.0 30 36 MG NA [facility] Commercial Insurance 0 / 0 [place]   1 [id number] 05/28/2024 05/28/2024 Methylphenidate Hcl (Tablet, Extended Release) 30.0 30 36 MG NA [facility] Commercial [place] 0 / 0 [place]   1 [id number] 04/22/2024 04/22/2024 Methylphenidate Hcl (Tablet, Extended Release) 30.0 30 36 MG NA [facility] Commercial Insurance 0 / 0 [place]   1 [id number] 03/05/2024 03/05/2024 Methylphenidate Hcl (Tablet, Extended Release) 30.0 30 36 MG NA [facility] Commercial [place] 0 / 0 [place]   1 [id number] 01/18/2024 01/02/2024 Methylphenidate Hcl (Tablet, Extended Release) 30.0 30 36 MG NA [facility] Commercial [place] 0 / 0 [place]   1 [id number] 12/20/2023 12/20/2023 Concerta (Tablet, Extended Release) 30.0 30 36 MG [facility]

## 2024-08-23 NOTE — TELEPHONE ENCOUNTER
Reason for call:   [x] Refill   [] Prior Auth  [] Other:     Office:   [] PCP/Provider -   [x] Specialty/Provider - [facility]        Does the patient have enough for 3 days?   [x] Yes   [] No - Send as HP to POD

## 2024-10-07 ENCOUNTER — OFFICE VISIT (OUTPATIENT)
Dept: PSYCHIATRY | Facility: CLINIC | Age: 44
End: 2024-10-07
Payer: COMMERCIAL

## 2024-10-07 DIAGNOSIS — F43.21 ADJUSTMENT DISORDER WITH DEPRESSED MOOD: ICD-10-CM

## 2024-10-07 DIAGNOSIS — F41.1 GENERALIZED ANXIETY DISORDER: Primary | ICD-10-CM

## 2024-10-07 DIAGNOSIS — F90.0 ADHD (ATTENTION DEFICIT HYPERACTIVITY DISORDER), INATTENTIVE TYPE: ICD-10-CM

## 2024-10-07 DIAGNOSIS — R41.840 CONCENTRATION DEFICIT: ICD-10-CM

## 2024-10-07 PROCEDURE — 90833 PSYTX W PT W E/M 30 MIN: CPT | Performed by: STUDENT IN AN ORGANIZED HEALTH CARE EDUCATION/TRAINING PROGRAM

## 2024-10-07 PROCEDURE — 99214 OFFICE O/P EST MOD 30 MIN: CPT | Performed by: STUDENT IN AN ORGANIZED HEALTH CARE EDUCATION/TRAINING PROGRAM

## 2024-10-07 RX ORDER — MULTIVITAMIN WITH IRON
50 TABLET ORAL DAILY
COMMUNITY

## 2024-10-07 RX ORDER — SERTRALINE HYDROCHLORIDE 100 MG/1
100 TABLET, FILM COATED ORAL DAILY
Qty: 90 TABLET | Refills: 0 | Status: SHIPPED | OUTPATIENT
Start: 2024-10-07 | End: 2024-10-07

## 2024-10-07 RX ORDER — METHYLPHENIDATE HYDROCHLORIDE 36 MG/1
36 TABLET ORAL DAILY
Qty: 30 TABLET | Refills: 0 | Status: SHIPPED | OUTPATIENT
Start: 2024-10-07 | End: 2024-11-06

## 2024-10-07 RX ORDER — SERTRALINE HYDROCHLORIDE 100 MG/1
100 TABLET, FILM COATED ORAL DAILY
Qty: 90 TABLET | Refills: 0 | Status: SHIPPED | OUTPATIENT
Start: 2024-10-07 | End: 2025-01-05

## 2024-10-07 NOTE — BH TREATMENT PLAN
TREATMENT PLAN (Medication Management Only)        [facility] - [facility]    Name and Date of Birth:  Coby Appiah 44 y.o. 1980  Date of Treatment Plan: October 7, 2024  Diagnosis/Diagnoses:    1. Generalized anxiety disorder    2. ADHD (attention deficit hyperactivity disorder), inattentive type      Strengths/Personal Resources for Self-Care: supportive family, supportive friends, taking medications as prescribed.  Area/Areas of need (in own words): anxiety, ADHD symptoms  1. Long Term Goal: continue improvement in ADHD symptoms.  Target Date:6 months - 4/7/2025  Person/Persons responsible for completion of goal: Coby  2.  Short Term Objective (s) - How will we reach this goal?:   A. Provider new recommended medication/dosage changes and/or continue medication(s): continue current medications as prescribed.  B. N/A.  C. N/A.  Target Date:6 months - 4/7/2025  Person/Persons Responsible for Completion of Goal: Coby  Progress Towards Goals: continuing treatment  Treatment Modality: medication management every 3 months  Review due 180 days from date of this plan: 6 months - 2/7/2025  Expected length of service: maintenance  My Physician/PA/NP and I have developed this plan together and I agree to work on the goals and objectives. I understand the treatment goals that were developed for my treatment.

## 2024-10-07 NOTE — PSYCH
"MEDICATION MANAGEMENT NOTE        [facility]      Name and Date of Birth:  Coby Appiah 44 y.o. 1980 MRN: [id number]    Date of Visit: October 7, 2024    Reason for Visit: Follow-up visit regarding medication management     Chief Complaint: \"It's been a rough year\"    SUBJECTIVE:    Coby Appiah is a 44 y.o., female, with a past psychiatric history significant for ADHD, generalized anxiety disorder, who presents for follow-up appointment for medication management. Coby states that since their previous outpatient psychiatric appointment, she has been having some increased struggles.  She reports that she has been struggling with family issues and medical illnesses.  Reports that her dad was diagnosed with liver tumor and her mom recently completed mastectomy and is in treatment for breast cancer.  She is currently undergoing chemotherapy and is planned to do radiation.  Patient also lost her job in June and has been on unemployment since then, although also spending time assisting in medical care for her parents.  She reports feeling more tense and on edge.  Struggling with motivation and energy levels, stating that she has \"so much to do I do not even know where to start\".  She reports that she is not ruminating or worrying as much although finds that she has a sort of \"decision paralysis\" and cannot identify where to begin projects or complete tasks.  She reports that her sleep and appetite have been stable.  Reports no significant change in depressive symptoms, some sadness and hopelessness in regards to her job loss and parent's health although adamantly denies any suicidal or homicidal ideations.  No auditory or visual hallucinations reported.    Reports that she has not been taking the Concerta as consistently due to having loss of medical insurance initially (she has since regained it ).  She has been taking the Zoloft consistently " though.    Current Rating Scores:   Current PHQ-9   PHQ-2/9 Depression Screening    Little interest or pleasure in doing things: 0 - not at all  Feeling down, depressed, or hopeless: 0 - not at all  Trouble falling or staying asleep, or sleeping too much: 1 - several days  Feeling tired or having little energy: 3 - nearly every day  Poor appetite or overeatin - several days  Feeling bad about yourself - or that you are a failure or have let yourself or your family down: 0 - not at all  Trouble concentrating on things, such as reading the newspaper or watching television: 1 - several days  Moving or speaking so slowly that other people could have noticed. Or the opposite - being so fidgety or restless that you have been moving around a lot more than usual: 0 - not at all         Psychiatric Review Of Systems:    Appetite: no  Adverse eating: no  Weight changes: no  Insomnia/sleeplessness: no  Fatigue/anergy: decreased  Anhedonia/lack of interest: no  Attention/concentration: no  Psychomotor agitation/retardation: no  Somatic symptoms: no  Anxiety/panic attack: yes, worrying  Heidy/hypomania: no  Hopelessness/helplessness/worthlessness: no  Self-injurious behavior/high-risk behavior: no  Suicidal ideation: no  Homicidal ideation: no  Auditory hallucinations: no  Visual hallucinations: no  Other perceptual disturbances: no  Delusional thinking: no  Obsessive/compulsive symptoms: no    Review Of Systems:      Constitutional negative   ENT negative   Cardiovascular negative   Respiratory negative   Gastrointestinal negative   Genitourinary negative   Musculoskeletal negative   Integumentary negative   Neurological negative   Endocrine negative   Other Symptoms none, all other systems are negative     History Review:   The following portions of the patient's history were reviewed and updated as appropriate: allergies, current medications, past family history, past medical history, past social history, past surgical  history, and problem list. Previous progress notes/assessments from other providers reviewed as available.    Past Medical History:    No past medical history on file.     No Known Allergies    Substance Abuse History:    Social History     Substance and Sexual Activity   Alcohol Use Not Currently     Social History     Substance and Sexual Activity   Drug Use Not on file       Social History:    Social History     Socioeconomic History    Marital status: Single     Spouse name: Not on file    Number of children: Not on file    Years of education: Not on file    Highest education level: Not on file   Occupational History    Not on file   Tobacco Use    Smoking status: Former     Current packs/day: 0.00     Average packs/day: 1 pack/day for 20.4 years (20.4 ttl pk-yrs)     Types: Cigarettes     Start date:      Quit date: 2016     Years since quittin.3    Smokeless tobacco: Never   Vaping Use    Vaping status: Never Used   Substance and Sexual Activity    Alcohol use: Not Currently    Drug use: Not on file    Sexual activity: Not on file   Other Topics Concern    Not on file   Social History Narrative    Not on file     Social Determinants of Health     Financial Resource Strain: Not on file   Food Insecurity: Not on file   Transportation Needs: Not on file   Physical Activity: Not on file   Stress: Not on file   Social Connections: Not on file   Intimate Partner Violence: Not on file   Housing Stability: Not on file       Family Psychiatric History:     Family History   Problem Relation Age of Onset    Hypertension Mother     Heart disease Father     Diabetes Father     Kidney disease Paternal Grandmother     Heart attack Paternal Grandfather             OBJECTIVE:     Vital signs in last 24 hours:    There were no vitals filed for this visit.    Mental Status Evaluation:    Appearance age appropriate, casually dressed   Behavior cooperative, calm   Speech normal rate, normal volume, normal pitch   Mood  anxious   Affect constricted   Thought Processes organized, goal directed   Associations intact associations   Thought Content no overt delusions   Perceptual Disturbances: no auditory hallucinations, no visual hallucinations   Abnormal Thoughts  Risk Potential Suicidal ideation - None  Homicidal ideation - None  Potential for aggression - No   Orientation oriented to person, place, time/date, and situation   Memory recent and remote memory grossly intact   Consciousness alert and awake   Attention Span Concentration Span attention span and concentration are age appropriate   Intellect appears to be of average intelligence   Insight intact   Judgement intact   Muscle Strength and  Gait normal muscle strength and normal muscle tone, normal gait and normal balance   Motor activity no abnormal movements   Fund of Knowledge adequate knowledge of current events  adequate fund of knowledge regarding past history  adequate fund of knowledge regarding vocabulary    Pain none   Pain Scale 0       Laboratory Results: I have personally reviewed all pertinent laboratory/tests results    Recent Labs (last 4 months):   No visits with results within 4 Month(s) from this visit.   Latest known visit with results is:   No results found for any previous visit.         Summary:  Coby Appiah is a 42-year-old female who carries a past psychiatric history significant for ISSAC, adjustment disorder, possible ADHD that presents as a transfer of care from [facility].  Reporting history of anxiety and poor concentration through majority of her life although only sought treatment in 2021 during the COVID-19 pandemic.  At this time she was also struggling with some medical issues with her father.  She has had long standing inattentive symptoms as well as feeling of tension/restlessness and being on edge.  She started medication treatments, felt that Zoloft has been effective in reducing anxiety symptoms and found that Strattera has  been helpful for improving attention and focus.  She is currently pending neuropsychological testing.    5/26/23 - Still struggling with inattention and focus issues.  Some improvement with Strattera.  Amenable to further titration of this today to better target focus.  In the future we will consider increasing Zoloft to help with anxiety symptoms which could also be impacting her concentration.  Encouraged completion of neuropsychological testing to better characterize her diagnosis.    8/8/23 - She is still having difficulty with focus and concentration. Inattention has not improved with the increase in Strattera.  She describes clear history consistent with ADHD inattentive type.  Previously recommended neuropsychological testing by last provider although never completed this.  She states that she frequently forgets to do so.  I do feel at this point that ADHD symptoms are having significant impact on daily functioning and she would benefit from stimulant medication.  Discussed the potential risks and side effect profile associated with this including potential for appetite suppression, cardiac side effects, weight loss.  She expressed understanding of this.  Amenable to reaching out to the clinic if any such symptoms were to occur.  We will taper the Strattera to 40 mg for 1 week and then discontinue.  Start low-dose of Concerta 18 mg daily and then titrate as needed.    9/28/23 She is doing better with the Concerta; focus gradually improved. Better attentive and motivated. Feels there is still room for improvement though. No side effects from the medications. Will titrate to Concerta 36mg and monitor - advised to reach out with issues or concerns.     1/2/24 She is doing well; Concerta is effective and very helpful.  She is tolerating the increased dose without any major side effects or problems.  She feels better able to focus and concentrate and is overall more productive in her personal and work life.  She  expressed some concerns about Concerta not being covered by her insurance.  We will reach out and perform prior authorization as she is doing very well with his current medication regimen and I feel that adjusting to an alternative medication would be detrimental to her functioning currently.    10/7/24 She has been having more anxiety and depressive symptoms; linked to numersou stressors of parents health (dad with liver tumor and mom with breast cancer). She also lost her job in June and is on unemployment. She is having more decreased energy and motivation. We discussed increasing the Zoloft to target depression and anxiety symptoms. Provided supportive psychotherapy and counseling.    Assessment & Plan  Generalized anxiety disorder    Orders:    sertraline (ZOLOFT) 100 mg tablet; Take 1 tablet (100 mg total) by mouth daily    ADHD (attention deficit hyperactivity disorder), inattentive type    Orders:    methylphenidate (CONCERTA) 36 MG ER tablet; Take 1 tablet (36 mg total) by mouth daily Max Daily Amount: 36 mg    Adjustment disorder with depressed mood             Additonal Recommendations/Precautions:    Aware of need to follow up with family physician for medical issues  Aware of 24 hour and weekend coverage for urgent situations accessed by calling [facility] main practice number    Medications Risks/Benefits      Risks, Benefits And Possible Side Effects Of Medications:    Risks, benefits, and possible side effects of medications explained to Coby including risk of suicidality and serotonin syndrome related to treatment with antidepressants and risks of cardiovascular side effects including elevated blood pressure, risk of misuse, abuse or dependence and risk of increased anxiety related to treatment with stimulant medications. She verbalizes understanding and agreement for treatment..    Controlled Medication Discussion:     Coby has been filling controlled prescriptions on  time as prescribed according to [place] Prescription Drug Monitoring Program    Psychotherapy Provided:     Individual psychotherapy provided: Yes  Counseling was provided during the session today for 16 minutes.  Recent stressor including family problems, family conflict, family issues, medical illness in family, financial problems, lack of health insurance, job loss, everyday stressors, and occasional anxiety discussed with Coby.   Coping strategies including maintain positive attitude, reaching to other people for help if needed, and spending time with family reviewed with Coby.   Reassurance and supportive therapy provided.      Treatment Plan:    Completed and signed during the session: Yes - with Coby    This note was not shared with the patient due to reasonable likelihood of causing patient harm    Visit Time    Visit Start Time: 1102  Visit Stop Time: 1131  Total Visit Duration:  29 minutes        Colt Van MD 10/07/24

## 2024-11-08 DIAGNOSIS — F90.0 ADHD (ATTENTION DEFICIT HYPERACTIVITY DISORDER), INATTENTIVE TYPE: ICD-10-CM

## 2024-11-08 NOTE — TELEPHONE ENCOUNTER
Reason for call:   [x] Refill   [] Prior Auth  [] Other:     Office:   [] PCP/Provider -   [x] Specialty/Provider - [facility] / Colt Van MD    Medication: methylphenidate (CONCERTA) 36 MG ER tablet     Dose/Frequency: Take 1 tablet (36 mg total) by mouth daily     Quantity: 30    Pharmacy: [facility] - [place] - [address]     Does the patient have enough for 3 days?   [x] Yes   [] No - Send as HP to POD

## 2024-11-08 NOTE — TELEPHONE ENCOUNTER
10/10/2024 10/07/2024 Methylphenidate Hcl (Tablet, Extended Release) 30.0 30 36 MG NA [facility] [place]   1 [id number] 08/29/2024 08/23/2024 Methylphenidate Hcl (Tablet, Extended Release) 30.0 30 36 MG NA [facility] [place] / 0 [place]   1 [id number] 06/25/2024 06/24/2024 Methylphenidate Hcl (Tablet, Extended Release) 30.0 30 36 MG [facility] [place] 0 [place]   1 [id number] 05/28/2024 05/28/2024 Methylphenidate Hcl (Tablet, Extended Release) 30.0 30 36 MG [facility] [place]   1 [id number] 04/22/2024 04/22/2024 Methylphenidate Hcl (Tablet, Extended Release) 30.0 30 36 MG [facility] [place]   1 [id number] 03/05/2024 03/05/2024 Methylphenidate Hcl (Tablet, Extended Release) 30.0 30 36 MG NA [facility] [place]   1 [id number] 01/18/2024 01/02/2024 Methylphenidate Hcl (Tablet, Extended Release) 30.0 30 36 MG [facility] [place]   1 [id number] 12/20/2023 12/20/2023 Concerta (Tablet, Extended Release) 30.0 30 36 MG [facility] [place] / 0 [place]

## 2024-11-12 RX ORDER — METHYLPHENIDATE HYDROCHLORIDE 36 MG/1
36 TABLET ORAL DAILY
Qty: 30 TABLET | Refills: 0 | Status: SHIPPED | OUTPATIENT
Start: 2024-11-12 | End: 2024-12-12

## 2024-12-11 ENCOUNTER — TELEMEDICINE (OUTPATIENT)
Dept: PSYCHIATRY | Facility: CLINIC | Age: 44
End: 2024-12-11
Payer: COMMERCIAL

## 2024-12-11 DIAGNOSIS — F41.1 GENERALIZED ANXIETY DISORDER: ICD-10-CM

## 2024-12-11 DIAGNOSIS — F90.0 ADHD (ATTENTION DEFICIT HYPERACTIVITY DISORDER), INATTENTIVE TYPE: ICD-10-CM

## 2024-12-11 PROCEDURE — 90833 PSYTX W PT W E/M 30 MIN: CPT | Performed by: STUDENT IN AN ORGANIZED HEALTH CARE EDUCATION/TRAINING PROGRAM

## 2024-12-11 PROCEDURE — 99214 OFFICE O/P EST MOD 30 MIN: CPT | Performed by: STUDENT IN AN ORGANIZED HEALTH CARE EDUCATION/TRAINING PROGRAM

## 2024-12-11 RX ORDER — METHYLPHENIDATE HYDROCHLORIDE 36 MG/1
36 TABLET ORAL DAILY
Qty: 30 TABLET | Refills: 0 | Status: SHIPPED | OUTPATIENT
Start: 2024-12-11 | End: 2025-01-10

## 2024-12-11 RX ORDER — SERTRALINE HYDROCHLORIDE 100 MG/1
100 TABLET, FILM COATED ORAL DAILY
Qty: 90 TABLET | Refills: 0 | Status: SHIPPED | OUTPATIENT
Start: 2024-12-11 | End: 2025-03-11

## 2024-12-11 NOTE — PSYCH
Virtual Regular Visit    Verification of patient location:    Patient is located at Home in the following state in which I hold an active license [place]      Assessment/Plan:    Problem List Items Addressed This Visit    None  Visit Diagnoses         Generalized anxiety disorder        Relevant Medications    sertraline (ZOLOFT) 100 mg tablet    methylphenidate (CONCERTA) 36 MG ER tablet      ADHD (attention deficit hyperactivity disorder), inattentive type        Relevant Medications    sertraline (ZOLOFT) 100 mg tablet    methylphenidate (CONCERTA) 36 MG ER tablet          Reason for visit is   Chief Complaint   Patient presents with    Virtual Regular Visit          Encounter provider Colt Van MD      Recent Visits  No visits were found meeting these conditions.  Showing recent visits within past 7 days and meeting all other requirements  Today's Visits  Date Type Provider Dept   12/11/24 Telemedicine Colt Van MD [facility] [address]   Showing today's visits and meeting all other requirements  Future Appointments  No visits were found meeting these conditions.  Showing future appointments within next 150 days and meeting all other requirements       The patient was identified by name and date of birth. Coby Appiah was informed that this is a telemedicine visit and that the visit is being conducted throughthe Epic Embedded platform. She agrees to proceed..  My office door was closed. No one else was in the room.  She acknowledged consent and understanding of privacy and security of the video platform. The patient has agreed to participate and understands they can discontinue the visit at any time.    Patient is aware this is a billable service.     Subjective  Coby Appiah is a 44 y.o. female .      HPI     No past medical history on file.    Past Surgical History:   Procedure Laterality Date    WISDOM TOOTH EXTRACTION  1996       Current Outpatient Medications   Medication Sig  "Dispense Refill    levocetirizine (XYZAL) 5 MG tablet Take 5 mg by mouth daily      methylphenidate (CONCERTA) 36 MG ER tablet Take 1 tablet (36 mg total) by mouth daily Max Daily Amount: 36 mg 30 tablet 0    sertraline (ZOLOFT) 100 mg tablet Take 1 tablet (100 mg total) by mouth daily 90 tablet 0    vitamin B-12 (CYANOCOBALAMIN) 50 MCG tablet Take 50 mcg by mouth daily       No current facility-administered medications for this visit.        No Known Allergies    Review of Systems    Video Exam    There were no vitals filed for this visit.    Physical Exam     Medication Management  [facility]         Name and Date of Birth:  Coby Appiah 44 y.o. 1980 MRN: [id number]    Date of Visit: December 11, 2024    Reason for Visit: Follow-up visit regarding medication management     Chief Complaint: \"Things are \"OK\"\"    SUBJECTIVE:    Coby Appiah is a 44 y.o., female, with a past psychiatric history significant for ADHD, generalized anxiety disorder, who presents for follow-up appointment for medication management. Coby states that since their previous outpatient psychiatric appointment, she is doing fairly well.  Over the past few months she notes that since previous appointment increasing the Zoloft was tolerated well.  No negative side effects.  Feels that her depression and anxiety symptoms are slightly better.  Does report feeling less anergic and feels more better rested at night.  Reports that she is experiencing less mind racing and rumination.  She reports that there are still various stresses in her life, her mother undergoing chemotherapy and father found to have a \"spot on his liver\" and is worried that he may too may have cancer.  She is trying to be more mindful, practicing positivity and utilizing coping strategies.  No reported SI, HI, AVH.    Current Rating Scores:   Current PHQ-9   PHQ-2/9 Depression Screening    Little interest or pleasure in doing things: 1 - " several days  Feeling down, depressed, or hopeless: 1 - several days  Trouble falling or staying asleep, or sleeping too much: 1 - several days  Feeling tired or having little energy: 2 - more than half the days  Poor appetite or overeatin - several days  Feeling bad about yourself - or that you are a failure or have let yourself or your family down: 1 - several days  Trouble concentrating on things, such as reading the newspaper or watching television: 1 - several days  Moving or speaking so slowly that other people could have noticed. Or the opposite - being so fidgety or restless that you have been moving around a lot more than usual: 0 - not at all         Psychiatric Review Of Systems:    Appetite: no  Adverse eating: no  Weight changes: no  Insomnia/sleeplessness: no  Fatigue/anergy: no  Anhedonia/lack of interest: no  Attention/concentration: no  Psychomotor agitation/retardation: no  Somatic symptoms: no  Anxiety/panic attack: worrying  Heidy/hypomania: no  Hopelessness/helplessness/worthlessness: no  Self-injurious behavior/high-risk behavior: no  Suicidal ideation: no  Homicidal ideation: no  Auditory hallucinations: no  Visual hallucinations: no  Other perceptual disturbances: no  Delusional thinking: no  Obsessive/compulsive symptoms: no    Review Of Systems:      Constitutional negative   ENT negative   Cardiovascular negative   Respiratory negative   Gastrointestinal negative   Genitourinary negative   Musculoskeletal negative   Integumentary negative   Neurological negative   Endocrine negative   Other Symptoms none, all other systems are negative     History Review:   The following portions of the patient's history were reviewed and updated as appropriate: allergies, current medications, past family history, past medical history, past social history, past surgical history, and problem list. Previous progress notes/assessments from other providers reviewed as available.    Past Medical  History:    No past medical history on file.     No Known Allergies    Substance Abuse History:    Social History     Substance and Sexual Activity   Alcohol Use Not Currently     Social History     Substance and Sexual Activity   Drug Use Not on file       Social History:    Social History     Socioeconomic History    Marital status: Single     Spouse name: Not on file    Number of children: Not on file    Years of education: Not on file    Highest education level: Not on file   Occupational History    Not on file   Tobacco Use    Smoking status: Former     Current packs/day: 0.00     Average packs/day: 1 pack/day for 20.4 years (20.4 ttl pk-yrs)     Types: Cigarettes     Start date:      Quit date: 2016     Years since quittin.5    Smokeless tobacco: Never   Vaping Use    Vaping status: Never Used   Substance and Sexual Activity    Alcohol use: Not Currently    Drug use: Not on file    Sexual activity: Not on file   Other Topics Concern    Not on file   Social History Narrative    Not on file     Social Drivers of Health     Financial Resource Strain: Not on file   Food Insecurity: Not on file   Transportation Needs: Not on file   Physical Activity: Not on file   Stress: Not on file   Social Connections: Not on file   Intimate Partner Violence: Not on file   Housing Stability: Not on file       Family Psychiatric History:     Family History   Problem Relation Age of Onset    Hypertension Mother     Heart disease Father     Diabetes Father     Kidney disease Paternal Grandmother     Heart attack Paternal Grandfather             OBJECTIVE:     Vital signs in last 24 hours:    There were no vitals filed for this visit.    Mental Status Evaluation:    Appearance age appropriate, casually dressed   Behavior cooperative, calm   Speech normal rate, normal volume, normal pitch   Mood euthymic   Affect normal range and intensity, appropriate   Thought Processes organized, goal directed   Associations intact  associations   Thought Content no overt delusions   Perceptual Disturbances: no auditory hallucinations, no visual hallucinations   Abnormal Thoughts  Risk Potential Suicidal ideation - None  Homicidal ideation - None  Potential for aggression - No   Orientation oriented to person, place, time/date, and situation   Memory recent and remote memory grossly intact   Consciousness alert and awake   Attention Span Concentration Span attention span and concentration are age appropriate   Intellect appears to be of average intelligence   Insight intact   Judgement intact   Muscle Strength and  Gait normal muscle strength and normal muscle tone, normal gait and normal balance   Motor activity no abnormal movements   Fund of Knowledge adequate knowledge of current events  adequate fund of knowledge regarding past history  adequate fund of knowledge regarding vocabulary    Pain none   Pain Scale 0       Laboratory Results: I have personally reviewed all pertinent laboratory/tests results    Recent Labs (last 4 months):   No visits with results within 4 Month(s) from this visit.   Latest known visit with results is:   No results found for any previous visit.         Summary:  Coby Appiah is a 42-year-old female who carries a past psychiatric history significant for ISSAC, adjustment disorder, possible ADHD that presents as a transfer of care from [facility].  Reporting history of anxiety and poor concentration through majority of her life although only sought treatment in 2021 during the COVID-19 pandemic.  At this time she was also struggling with some medical issues with her father.  She has had long standing inattentive symptoms as well as feeling of tension/restlessness and being on edge.  She started medication treatments, felt that Zoloft has been effective in reducing anxiety symptoms and found that Strattera has been helpful for improving attention and focus.  She is currently pending neuropsychological  testing.    5/26/23 - Still struggling with inattention and focus issues.  Some improvement with Strattera.  Amenable to further titration of this today to better target focus.  In the future we will consider increasing Zoloft to help with anxiety symptoms which could also be impacting her concentration.  Encouraged completion of neuropsychological testing to better characterize her diagnosis.    8/8/23 - She is still having difficulty with focus and concentration. Inattention has not improved with the increase in Strattera.  She describes clear history consistent with ADHD inattentive type.  Previously recommended neuropsychological testing by last provider although never completed this.  She states that she frequently forgets to do so.  I do feel at this point that ADHD symptoms are having significant impact on daily functioning and she would benefit from stimulant medication.  Discussed the potential risks and side effect profile associated with this including potential for appetite suppression, cardiac side effects, weight loss.  She expressed understanding of this.  Amenable to reaching out to the clinic if any such symptoms were to occur.  We will taper the Strattera to 40 mg for 1 week and then discontinue.  Start low-dose of Concerta 18 mg daily and then titrate as needed.    9/28/23 She is doing better with the Concerta; focus gradually improved. Better attentive and motivated. Feels there is still room for improvement though. No side effects from the medications. Will titrate to Concerta 36mg and monitor - advised to reach out with issues or concerns.     1/2/24 She is doing well; Concerta is effective and very helpful.  She is tolerating the increased dose without any major side effects or problems.  She feels better able to focus and concentrate and is overall more productive in her personal and work life.  She expressed some concerns about Concerta not being covered by her insurance.  We will reach out  "and perform prior authorization as she is doing very well with his current medication regimen and I feel that adjusting to an alternative medication would be detrimental to her functioning currently.    10/7/24 She has been having more anxiety and depressive symptoms; linked to numersou stressors of parents health (dad with liver tumor and mom with breast cancer). She also lost her job in June and is on unemployment. She is having more decreased energy and motivation. We discussed increasing the Zoloft to target depression and anxiety symptoms. Provided supportive psychotherapy and counseling.    12/11/24 She reports things are relatively stable; tolerated the increase in the Zoloft.  Feels less anxious and depressed.  Sleeping better and less anergic.  Still some racing thoughts although she is trying to practice mindfulness.  Ongoing stresses in her life including mother completing chemotherapy and father noted to have \"another spot on his liver\".  She has good supports in her family and feels that they are helpful.  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  She is trying to find something more long-term in the future.  Tolerating medications without any side effects    Assessment & Plan  Generalized anxiety disorder    Orders:    sertraline (ZOLOFT) 100 mg tablet; Take 1 tablet (100 mg total) by mouth daily    ADHD (attention deficit hyperactivity disorder), inattentive type    Orders:    methylphenidate (CONCERTA) 36 MG ER tablet; Take 1 tablet (36 mg total) by mouth daily Max Daily Amount: 36 mg        Additonal Recommendations/Precautions:    Aware of need to follow up with family physician for medical issues  Aware of 24 hour and weekend coverage for urgent situations accessed by calling [facility] main practice number    Medications Risks/Benefits      Risks, Benefits And Possible Side Effects Of Medications:    Risks, benefits, and possible side " effects of medications explained to Coby including risk of suicidality and serotonin syndrome related to treatment with antidepressants and risks of cardiovascular side effects including elevated blood pressure, risk of misuse, abuse or dependence and risk of increased anxiety related to treatment with stimulant medications. She verbalizes understanding and agreement for treatment..    Controlled Medication Discussion:     Coby has been filling controlled prescriptions on time as prescribed according to [place] Prescription Drug Monitoring Program    Psychotherapy Provided:     Individual psychotherapy provided: Yes  Counseling was provided during the session today for 17 minutes.  Recent stressor including family problems, family conflict, medical illness in family, financial problems, health issues, recent medication change, and occasional anxiety discussed with Coby.   Coping skills reviewed with oCby (mindfulness)  Reassurance and supportive therapy provided.      Treatment Plan:    Completed and signed during the session: Not applicable - Treatment Plan not due at this session    This note was not shared with the patient due to reasonable likelihood of causing patient harm    Visit Time    Visit Start Time: 534pm  Visit Stop Time: 554pm  Total Visit Duration:  20 minutes        Colt Van MD 12/11/24

## 2025-02-12 DIAGNOSIS — F41.1 GENERALIZED ANXIETY DISORDER: ICD-10-CM

## 2025-02-12 DIAGNOSIS — F90.0 ADHD (ATTENTION DEFICIT HYPERACTIVITY DISORDER), INATTENTIVE TYPE: ICD-10-CM

## 2025-02-12 NOTE — TELEPHONE ENCOUNTER
Reason for call:   [x] Refill   [] Prior Auth  [] Other:     Office:   [] PCP/Provider -   [x] Specialty/Provider -Colt Van,      Medication: methylphenidate (CONCERTA) 36 MG ER   Dose/Frequency: Take 1 tablet (36 mg total) by mouth daily   Quantity: 30    sertraline (ZOLOFT) 100 mg   1 daily  Qty 30    Pharmacy: [facility]    Does the patient have enough for 3 days?   [x] Yes   [] No - Send as HP to POD

## 2025-02-13 RX ORDER — SERTRALINE HYDROCHLORIDE 100 MG/1
100 TABLET, FILM COATED ORAL DAILY
Qty: 90 TABLET | Refills: 0 | Status: SHIPPED | OUTPATIENT
Start: 2025-02-13 | End: 2025-05-14

## 2025-02-13 RX ORDER — METHYLPHENIDATE HYDROCHLORIDE 36 MG/1
36 TABLET ORAL DAILY
Qty: 30 TABLET | Refills: 0 | Status: SHIPPED | OUTPATIENT
Start: 2025-02-13 | End: 2025-03-15

## 2025-04-03 DIAGNOSIS — F90.0 ADHD (ATTENTION DEFICIT HYPERACTIVITY DISORDER), INATTENTIVE TYPE: ICD-10-CM

## 2025-04-03 NOTE — TELEPHONE ENCOUNTER
Reason for call:   [x] Refill   [] Prior Auth  [] Other:     Office:   [] PCP/Provider -   [x] Specialty/Provider -     Medication:   methylphenidate (CONCERTA) 36 MG       Dose/Frequency:  Take 1 tablet (36 mg total) by mouth daily Max Daily Amount: 36 mg     Quantity: 30    Pharmacy: [facility] - [facility]se[facility]  [address] [place] [address] Pharmacy   Does the patient have enough for 3 days?   [] Yes   [x] No - Send as HP to POD    [facility]   Does the patient have enough for 10 days?   [] Yes   [] No - Send as HP to POD

## 2025-04-03 NOTE — TELEPHONE ENCOUNTER
Refill must be reviewed and completed by the office or provider. The refill is unable to be approved or denied by the medication management team.                       [id number] 02/13/2025 02/13/2025 Methylphenidate Hcl (Tablet, Extended Release) 30.0 30 36 MG NA [facility] [place] / 0 [place]     1 [id number] 12/11/2024 12/11/2024 Methylphenidate Hcl (Tablet, Extended Release) 30.0 30 36 MG NA [facility] [place]    1 [id number] 11/12/2024 11/12/2024 Methylphenidate Hcl (Tablet, Extended Release) 30.0 30 36 MG NA [facility] [place]    1 [id number] 10/10/2024 10/07/2024 Methylphenidate Hcl (Tablet, Extended Release) 30.0 30 36 MG NA SABI ACKERMAN

## 2025-04-04 RX ORDER — METHYLPHENIDATE HYDROCHLORIDE 36 MG/1
36 TABLET ORAL DAILY
Qty: 30 TABLET | Refills: 0 | Status: SHIPPED | OUTPATIENT
Start: 2025-04-04 | End: 2025-05-04

## 2025-05-02 DIAGNOSIS — F90.0 ADHD (ATTENTION DEFICIT HYPERACTIVITY DISORDER), INATTENTIVE TYPE: ICD-10-CM

## 2025-05-02 DIAGNOSIS — F41.1 GENERALIZED ANXIETY DISORDER: ICD-10-CM

## 2025-05-02 RX ORDER — SERTRALINE HYDROCHLORIDE 100 MG/1
100 TABLET, FILM COATED ORAL DAILY
Qty: 90 TABLET | Refills: 0 | Status: SHIPPED | OUTPATIENT
Start: 2025-05-02 | End: 2025-07-31

## 2025-05-02 RX ORDER — METHYLPHENIDATE HYDROCHLORIDE 36 MG/1
36 TABLET ORAL DAILY
Qty: 30 TABLET | Refills: 0 | Status: SHIPPED | OUTPATIENT
Start: 2025-05-02 | End: 2025-06-01

## 2025-05-02 NOTE — TELEPHONE ENCOUNTER
Reason for call:   [x] Refill   [] Prior Auth  [] Other:     Office:   [] PCP/Provider -   [x] Specialty/Provider - psych/: Colt Van MD     Medication:   methylphenidate (CONCERTA) 36 MG ER tablet Take 1 tablet (36 mg total) by mouth daily Max Daily Amount: 36 mg   30    sertraline (ZOLOFT) 100 mg tablet Take 1 tablet (100 mg total) by mouth daily   90      Pharmacy: [facility] - [address] [phone]     [facility]   Does the patient have enough for 3 days?   [x] Yes   [] No - Send as HP to POD    [facility] Pharm[facility]   Does the patient have enough for 10 days?   [] Yes   [] No - Send as HP to POD

## 2025-06-25 DIAGNOSIS — F90.0 ADHD (ATTENTION DEFICIT HYPERACTIVITY DISORDER), INATTENTIVE TYPE: ICD-10-CM

## 2025-06-25 DIAGNOSIS — F41.1 GENERALIZED ANXIETY DISORDER: ICD-10-CM

## 2025-06-25 NOTE — TELEPHONE ENCOUNTER
Reason for call:   [x] Refill   [] Prior Auth  [] Other:     Office:   [] PCP/Provider -   [x] Specialty/Provider -     Medication:   sertraline (ZOLOFT) 100 mg        Dose/Frequency:  Take 1 tablet (100 mg total) by mouth daily     Quantity: 90    Pharmacy: [address]        Medication: methylphenidate (CONCERTA) 36 MG    Dose/Frequency: Take 1 tablet (36 mg total) by mouth daily Max Daily Amount: 36 mg     Quantity: 30    Pharmacy: [address] [facility]   Does the patient have enough for 3 days?   [x] Yes   [] No - Send as HP to POD    [facility] Pharmacy   Does the patient have enough for 10 days?   [] Yes   [] No - Send as HP to POD

## 2025-06-27 RX ORDER — SERTRALINE HYDROCHLORIDE 100 MG/1
100 TABLET, FILM COATED ORAL DAILY
Qty: 90 TABLET | Refills: 0 | Status: SHIPPED | OUTPATIENT
Start: 2025-06-27 | End: 2025-09-25

## 2025-06-27 RX ORDER — METHYLPHENIDATE HYDROCHLORIDE 36 MG/1
36 TABLET ORAL DAILY
Qty: 30 TABLET | Refills: 0 | Status: SHIPPED | OUTPATIENT
Start: 2025-06-27 | End: 2025-07-27

## 2025-06-27 NOTE — TELEPHONE ENCOUNTER
Refill must be reviewed and completed by the office or provider. The refill is unable to be approved or denied by the medication management team.      05/02/2025 05/02/2025 05/02/2025 Methylphenidate Hcl (Tablet, Extended Release) 30.0 30 36 MG NA [facility] Commercial [place] 0 [place]   1 [id number] 04/04/2025 04/04/2025 04/04/2025 Methylphenidate Hcl (Tablet, Extended Release) 30.0 30 36 MG NA [facility] [place]   1 [id number] 02/13/2025 02/13/2025 02/13/2025 Methylphenidate Hcl (Tablet, Extended Release) 30.0 30 36 MG [facility] Commercial [place] 0 / 0 [place]

## 2025-07-18 ENCOUNTER — TELEMEDICINE (OUTPATIENT)
Dept: OTHER | Facility: HOSPITAL | Age: 45
End: 2025-07-18
Payer: COMMERCIAL

## 2025-07-18 VITALS — BODY MASS INDEX: 36.96 KG/M2 | WEIGHT: 230 LBS | HEIGHT: 66 IN

## 2025-07-18 DIAGNOSIS — R39.15 URGENCY OF URINATION: Primary | ICD-10-CM

## 2025-07-18 PROCEDURE — 99213 OFFICE O/P EST LOW 20 MIN: CPT | Performed by: PHYSICIAN ASSISTANT

## 2025-07-18 NOTE — PATIENT INSTRUCTIONS
"You can go to any outpatient [facility] to complete the testing that was ordered  Just provide your name and date of birth at registration and they will be able to pull up the orders.  You can search for a lab using [employer] \"Find Care\" and filter by \"Labs\" or click the link below:  [email or web address]    The results will go directly to your [employer] dory under \"Test Results\"    [facility] Phone number is [phone] if you need assistance or have further questions   "

## 2025-07-18 NOTE — PROGRESS NOTES
Virtual Regular Visit  Name: Coby Appiah      : 1980      MRN: [id number]  Encounter Provider: Shannon D Severino, PA-C  Encounter Date: 2025   Encounter department: VIRTUAL CARE       Verification of patient location:  Patient is located at Home in the following state in which I hold an active license [place] :  Assessment & Plan  Urgency of urination    Orders:    UA w Reflex to Microscopic w Reflex to Culture; Future        Encounter provider Shannon D Severino, PA-C    The patient was identified by name and date of birth. Coby Appiah was informed that this is a telemedicine visit and that the visit is being conducted through the Epic Embedded platform. She agrees to proceed..  My office door was closed. No one else was in the room. She acknowledged consent and understanding of privacy and security of the video platform. The patient has agreed to participate and understands they can discontinue the visit at any time.    Patient is aware this is a billable service.     History obtained from: patient  History of Present Illness     Pt reports UTI starting about 2 weeks ago. Has urgency, feeling of incomplete emptying, cloudy urine. Denies dysuria, hematuria, back pain, fevers, changes to vaginal discharge, concern for STI or pregnancy. Has had increased thirst. No FHx DM.       Review of Systems   Constitutional:  Negative for fever.   HENT:  Negative for nosebleeds.    Eyes:  Negative for redness.   Respiratory:  Negative for shortness of breath.    Cardiovascular:  Negative for chest pain.   Gastrointestinal:  Negative for blood in stool.   Genitourinary:  Positive for decreased urine volume, frequency and urgency. Negative for dysuria, hematuria and vaginal discharge.   Musculoskeletal:  Negative for gait problem.   Skin:  Negative for rash.   Neurological:  Negative for seizures.   Psychiatric/Behavioral:  Negative for behavioral problems.        Objective   There were no vitals  taken for this visit.    Physical Exam  Constitutional:       General: She is not in acute distress.     Appearance: Normal appearance. She is not toxic-appearing.   HENT:      Head: Normocephalic and atraumatic.      Nose: No rhinorrhea.      Mouth/Throat:      Mouth: Mucous membranes are moist.     Eyes:      Conjunctiva/sclera: Conjunctivae normal.      Comments: glasses   Pulmonary:      Effort: Pulmonary effort is normal. No respiratory distress.      Breath sounds: No wheezing (no gross audible wheeze through computer).     Musculoskeletal:      Cervical back: Normal range of motion.     Skin:     Findings: No rash (on face or neck).     Neurological:      Mental Status: She is alert.      Cranial Nerves: No dysarthria or facial asymmetry.     Psychiatric:         Mood and Affect: Mood normal.         Behavior: Behavior normal.         Visit Time  Total Visit Duration: 7 minutes not including the time spent for establishing the audio/video connection.